# Patient Record
Sex: FEMALE | Race: WHITE | NOT HISPANIC OR LATINO | Employment: FULL TIME | URBAN - METROPOLITAN AREA
[De-identification: names, ages, dates, MRNs, and addresses within clinical notes are randomized per-mention and may not be internally consistent; named-entity substitution may affect disease eponyms.]

---

## 2017-10-10 ENCOUNTER — OFFICE VISIT (OUTPATIENT)
Dept: URGENT CARE | Facility: CLINIC | Age: 57
End: 2017-10-10
Payer: COMMERCIAL

## 2017-10-10 ENCOUNTER — APPOINTMENT (OUTPATIENT)
Dept: RADIOLOGY | Facility: CLINIC | Age: 57
End: 2017-10-10
Payer: COMMERCIAL

## 2017-10-10 DIAGNOSIS — S90.859A: ICD-10-CM

## 2017-10-10 PROCEDURE — 73630 X-RAY EXAM OF FOOT: CPT

## 2017-10-10 PROCEDURE — G0382 LEV 3 HOSP TYPE B ED VISIT: HCPCS

## 2017-10-14 NOTE — PROGRESS NOTES
Assessment  1  Foreign body in foot (917 6) (P79 089Q)   2  Left foot pain (729 5) (B61 693)    Plan  Foreign body in foot    · * XR FOOT 3+ VIEW LEFT; Status:Active - Retrospective Authorization; Requested  for:10Oct2017;     Discussion/Summary  Discussion Summary:   Follow up with podiatry  Understands and agrees with treatment plan: The treatment plan was reviewed with the patient/guardian  The patient/guardian understands and agrees with the treatment plan   Counseling Documentation With Imm: The patient was counseled regarding instructions for management  Chief Complaint  1  Skin Wound  Chief Complaint Free Text Note Form: C/O small tender lump on lateral aspect of her left foot x 3 days  Pt feels that it could be a splinter  History of Present Illness  HPI: Stepped [place] beach [place] of [place]  States her  removed a splinter from her left foot and is having persistent pain at base of left 5th metatarsal  Believes she still has a splinter in her foot and it is painful to walk and place pressure on the area  [facility]:   Pain Assessment   the patient states they have pain  The pain is located in the lateral aspect left foot  The patient describes the pain as aching  (on a scale of 0 to 10, the patient rates the pain at 8 )   Abuse And Domestic Violence Screen   Domestic violence screen not done today  Review of Systems  Focused-Female:   Constitutional: no fever-- and-- no chills  ENT: no hoarseness  Cardiovascular: no chest pain  Respiratory: no cough  Gastrointestinal: no nausea-- and-- no vomiting  Musculoskeletal: no arthralgias,-- no joint swelling-- and-- no myalgias  Integumentary: no rashes  Neurological: no headache  ROS Reviewed:   ROS reviewed  Active Problems  1  Hypothyroidism (244 9) (E03 9)   2  Lyme disease (088 81) (A69 20)   3   Streptococcal sore throat (034 0) (J02 0)    Past Medical History  1  History of pharyngitis (V12 69) (Z87 09)   2  History of vaginal pruritus (V13 29) (Z87 42)   3  History of Vaginal burning (625 8) (N94 9)  Active Problems And Past Medical History Reviewed: The active problems and past medical history were reviewed and updated today  Social History   · Former smoker (Z11 26) (I22 448)   · Never A Smoker  Social History Reviewed: The social history was reviewed and updated today  Surgical History  1  History of Tonsillectomy With Adenoidectomy  Surgical History Reviewed: The surgical history was reviewed and updated today  Current Meds   1  [place] Thyroid 30 MG Oral Tablet Recorded   2  [place] Thyroid TABS; Therapy: (Recorded:[id number]) to Recorded   3  Hormone Cream Base Cream Recorded   4  Temovate E 0 05 % CREA; APPLY AND GENTLY MASSAGE INTO AFFECTED AREA(S)   TWICE DAILY; Therapy: [id number] to (Last FL:[id number])  Requested for: 01IEJ5437 Ordered  Medication List Reviewed: The medication list was reviewed and updated today  Allergies  1  Amoxil TABS    Vitals  Signs   Recorded: [id number] 02:32PM   Temperature: 97 2 F  Heart Rate: 78  Respiration: 18  Systolic: 045  Diastolic: 72  Height: 5 ft 2 in  Weight: 175 lb   BMI Calculated: 32 01  BSA Calculated: 1 81  O2 Saturation: 99  Pain Scale: 8    Physical Exam    Constitutional   General appearance: No acute distress, well appearing and well nourished  Eyes   Conjunctiva and lids: No swelling, erythema or discharge  Ears, Nose, Mouth, and Throat   External inspection of ears and nose: Normal     Pulmonary   Respiratory effort: No increased work of breathing or signs of respiratory distress  Musculoskeletal   Gait and station: Normal  -- tenderness over lateral aspect at base of left 5th metatarsal, no drainage  Results/Data  Diagnostic Studies Reviewed: I personally reviewed the films/images/results in the office today  My interpretation follows  X-ray Review left foot - no radiopaque foreign body, no fracture        Signatures   Electronically signed by : LUIZ Rincon; Oct 10 2017  3:19PM EST                       (Author)    Electronically signed by : ADELINA Jackson ; Oct 13 2017 11:17AM EST                       (Co-author)

## 2018-07-24 ENCOUNTER — APPOINTMENT (OUTPATIENT)
Dept: LAB | Facility: CLINIC | Age: 58
End: 2018-07-24
Payer: COMMERCIAL

## 2018-07-24 ENCOUNTER — TRANSCRIBE ORDERS (OUTPATIENT)
Dept: URGENT CARE | Facility: CLINIC | Age: 58
End: 2018-07-24

## 2018-07-24 DIAGNOSIS — D51.0 PERNICIOUS ANEMIA: ICD-10-CM

## 2018-07-24 DIAGNOSIS — E78.2 MIXED HYPERLIPIDEMIA: ICD-10-CM

## 2018-07-24 DIAGNOSIS — R53.83 OTHER FATIGUE: Primary | ICD-10-CM

## 2018-07-24 DIAGNOSIS — R53.83 OTHER FATIGUE: ICD-10-CM

## 2018-07-24 LAB
25(OH)D3 SERPL-MCNC: 34.4 NG/ML (ref 30–100)
ALBUMIN SERPL BCP-MCNC: 4.1 G/DL (ref 3.5–5)
ALP SERPL-CCNC: 57 U/L (ref 46–116)
ALT SERPL W P-5'-P-CCNC: 34 U/L (ref 12–78)
ANION GAP SERPL CALCULATED.3IONS-SCNC: 3 MMOL/L (ref 4–13)
AST SERPL W P-5'-P-CCNC: 24 U/L (ref 5–45)
BASOPHILS # BLD AUTO: 0.05 THOUSANDS/ΜL (ref 0–0.1)
BASOPHILS NFR BLD AUTO: 1 % (ref 0–1)
BILIRUB DIRECT SERPL-MCNC: 0.1 MG/DL (ref 0–0.2)
BILIRUB SERPL-MCNC: 0.5 MG/DL (ref 0.2–1)
BUN SERPL-MCNC: 14 MG/DL (ref 5–25)
CALCIUM SERPL-MCNC: 9.4 MG/DL (ref 8.3–10.1)
CHLORIDE SERPL-SCNC: 104 MMOL/L (ref 100–108)
CHOLEST SERPL-MCNC: 227 MG/DL (ref 50–200)
CO2 SERPL-SCNC: 30 MMOL/L (ref 21–32)
CREAT SERPL-MCNC: 0.71 MG/DL (ref 0.6–1.3)
CRP SERPL HS-MCNC: <0.9 MG/L
EOSINOPHIL # BLD AUTO: 0.09 THOUSAND/ΜL (ref 0–0.61)
EOSINOPHIL NFR BLD AUTO: 2 % (ref 0–6)
ERYTHROCYTE [DISTWIDTH] IN BLOOD BY AUTOMATED COUNT: 14.3 % (ref 11.6–15.1)
ERYTHROCYTE [SEDIMENTATION RATE] IN BLOOD: 7 MM/HOUR (ref 0–20)
FERRITIN SERPL-MCNC: 94 NG/ML (ref 8–388)
GFR SERPL CREATININE-BSD FRML MDRD: 94 ML/MIN/1.73SQ M
GLUCOSE P FAST SERPL-MCNC: 96 MG/DL (ref 65–99)
HCT VFR BLD AUTO: 43.3 % (ref 34.8–46.1)
HDLC SERPL-MCNC: 95 MG/DL (ref 40–60)
HGB BLD-MCNC: 13.8 G/DL (ref 11.5–15.4)
IMM GRANULOCYTES # BLD AUTO: 0.01 THOUSAND/UL (ref 0–0.2)
IMM GRANULOCYTES NFR BLD AUTO: 0 % (ref 0–2)
IRON SATN MFR SERPL: 20 %
IRON SERPL-MCNC: 71 UG/DL (ref 50–170)
LDLC SERPL CALC-MCNC: 114 MG/DL (ref 0–100)
LYMPHOCYTES # BLD AUTO: 1.67 THOUSANDS/ΜL (ref 0.6–4.47)
LYMPHOCYTES NFR BLD AUTO: 36 % (ref 14–44)
MCH RBC QN AUTO: 28.8 PG (ref 26.8–34.3)
MCHC RBC AUTO-ENTMCNC: 31.9 G/DL (ref 31.4–37.4)
MCV RBC AUTO: 90 FL (ref 82–98)
MONOCYTES # BLD AUTO: 0.42 THOUSAND/ΜL (ref 0.17–1.22)
MONOCYTES NFR BLD AUTO: 9 % (ref 4–12)
NEUTROPHILS # BLD AUTO: 2.35 THOUSANDS/ΜL (ref 1.85–7.62)
NEUTS SEG NFR BLD AUTO: 52 % (ref 43–75)
NONHDLC SERPL-MCNC: 132 MG/DL
NRBC BLD AUTO-RTO: 0 /100 WBCS
PLATELET # BLD AUTO: 271 THOUSANDS/UL (ref 149–390)
PMV BLD AUTO: 11.2 FL (ref 8.9–12.7)
POTASSIUM SERPL-SCNC: 4.6 MMOL/L (ref 3.5–5.3)
PROT SERPL-MCNC: 7.8 G/DL (ref 6.4–8.2)
RBC # BLD AUTO: 4.8 MILLION/UL (ref 3.81–5.12)
SODIUM SERPL-SCNC: 137 MMOL/L (ref 136–145)
T3FREE SERPL-MCNC: 2.49 PG/ML (ref 2.3–4.2)
TIBC SERPL-MCNC: 364 UG/DL (ref 250–450)
TRIGL SERPL-MCNC: 91 MG/DL
TSH SERPL DL<=0.05 MIU/L-ACNC: 1.73 UIU/ML (ref 0.36–3.74)
VIT B12 SERPL-MCNC: 455 PG/ML (ref 100–900)
WBC # BLD AUTO: 4.59 THOUSAND/UL (ref 4.31–10.16)

## 2018-07-24 PROCEDURE — 86141 C-REACTIVE PROTEIN HS: CPT

## 2018-07-24 PROCEDURE — 80076 HEPATIC FUNCTION PANEL: CPT

## 2018-07-24 PROCEDURE — 36415 COLL VENOUS BLD VENIPUNCTURE: CPT

## 2018-07-24 PROCEDURE — 80048 BASIC METABOLIC PNL TOTAL CA: CPT

## 2018-07-24 PROCEDURE — 84443 ASSAY THYROID STIM HORMONE: CPT

## 2018-07-24 PROCEDURE — 82728 ASSAY OF FERRITIN: CPT

## 2018-07-24 PROCEDURE — 80061 LIPID PANEL: CPT

## 2018-07-24 PROCEDURE — 83540 ASSAY OF IRON: CPT

## 2018-07-24 PROCEDURE — 82306 VITAMIN D 25 HYDROXY: CPT

## 2018-07-24 PROCEDURE — 85025 COMPLETE CBC W/AUTO DIFF WBC: CPT

## 2018-07-24 PROCEDURE — 85652 RBC SED RATE AUTOMATED: CPT

## 2018-07-24 PROCEDURE — 86617 LYME DISEASE ANTIBODY: CPT

## 2018-07-24 PROCEDURE — 86618 LYME DISEASE ANTIBODY: CPT

## 2018-07-24 PROCEDURE — 82607 VITAMIN B-12: CPT

## 2018-07-24 PROCEDURE — 83550 IRON BINDING TEST: CPT

## 2018-07-24 PROCEDURE — 84481 FREE ASSAY (FT-3): CPT

## 2018-07-26 LAB
B BURGDOR IGG SER IA-ACNC: 0.54
B BURGDOR IGM SER IA-ACNC: 1.94

## 2018-07-27 ENCOUNTER — HOSPITAL ENCOUNTER (OUTPATIENT)
Facility: HOSPITAL | Age: 58
Setting detail: OBSERVATION
Discharge: HOME | End: 2018-07-29
Attending: HOSPITALIST | Admitting: HOSPITALIST
Payer: COMMERCIAL

## 2018-07-27 DIAGNOSIS — K20.90 ACUTE ESOPHAGITIS: Primary | ICD-10-CM

## 2018-07-27 DIAGNOSIS — R51.9 HEADACHE: ICD-10-CM

## 2018-07-27 DIAGNOSIS — M25.562 ARTHRALGIA OF BOTH KNEES: ICD-10-CM

## 2018-07-27 DIAGNOSIS — M25.561 ARTHRALGIA OF BOTH KNEES: ICD-10-CM

## 2018-07-27 DIAGNOSIS — A69.20 LYME DISEASE: ICD-10-CM

## 2018-07-27 DIAGNOSIS — A69.20 ACUTE LYME DISEASE: ICD-10-CM

## 2018-07-27 PROBLEM — G44.89 OTHER HEADACHE SYNDROME: Status: ACTIVE | Noted: 2018-07-27

## 2018-07-27 PROBLEM — M79.10 MYALGIA: Status: ACTIVE | Noted: 2018-07-27

## 2018-07-27 LAB
APTT PPP: 31 SECONDS (ref 24–36)
B BURGDOR IGG PATRN SER IB-IMP: NEGATIVE
B BURGDOR IGM PATRN SER IB-IMP: NEGATIVE
B BURGDOR18KD IGG SER QL IB: NORMAL
B BURGDOR23KD IGG SER QL IB: NORMAL
B BURGDOR23KD IGM SER QL IB: NORMAL
B BURGDOR28KD IGG SER QL IB: NORMAL
B BURGDOR30KD IGG SER QL IB: NORMAL
B BURGDOR39KD IGG SER QL IB: NORMAL
B BURGDOR39KD IGM SER QL IB: NORMAL
B BURGDOR41KD IGG SER QL IB: NORMAL
B BURGDOR41KD IGM SER QL IB: NORMAL
B BURGDOR45KD IGG SER QL IB: NORMAL
B BURGDOR58KD IGG SER QL IB: NORMAL
B BURGDOR66KD IGG SER QL IB: NORMAL
B BURGDOR93KD IGG SER QL IB: NORMAL
BACTERIA UR QL AUTO: ABNORMAL /HPF
BASOPHILS # BLD AUTO: 0.1 THOUSANDS/ΜL (ref 0–0.1)
BASOPHILS NFR BLD AUTO: 1 % (ref 0–2)
BILIRUB UR QL STRIP: NEGATIVE
CLARITY UR: CLEAR
COLOR UR: YELLOW
EOSINOPHIL # BLD AUTO: 0.1 THOUSAND/ΜL (ref 0–0.61)
EOSINOPHIL NFR BLD AUTO: 2 % (ref 0–5)
ERYTHROCYTE [DISTWIDTH] IN BLOOD BY AUTOMATED COUNT: 14.2 % (ref 11.5–14.5)
ERYTHROCYTE [SEDIMENTATION RATE] IN BLOOD: 8 MM/HOUR (ref 0–30)
GLUCOSE UR STRIP-MCNC: NEGATIVE MG/DL
HCT VFR BLD AUTO: 41 % (ref 34.8–46.1)
HGB BLD-MCNC: 13.9 G/DL (ref 12–16)
HGB UR QL STRIP.AUTO: NEGATIVE
INR PPP: 1.04 (ref 0.9–1.5)
KETONES UR STRIP-MCNC: NEGATIVE MG/DL
LEUKOCYTE ESTERASE UR QL STRIP: ABNORMAL
LYMPHOCYTES # BLD AUTO: 2.2 THOUSANDS/ΜL (ref 0.6–4.47)
LYMPHOCYTES NFR BLD AUTO: 30 % (ref 21–51)
MCH RBC QN AUTO: 29.5 PG (ref 26–34)
MCHC RBC AUTO-ENTMCNC: 34 G/DL (ref 31–37)
MCV RBC AUTO: 87 FL (ref 81–99)
MONOCYTES # BLD AUTO: 0.7 THOUSAND/ΜL (ref 0.17–1.22)
MONOCYTES NFR BLD AUTO: 10 % (ref 2–12)
NEUTROPHILS # BLD AUTO: 4.2 THOUSANDS/ΜL (ref 1.4–6.5)
NEUTS SEG NFR BLD AUTO: 58 % (ref 42–75)
NITRITE UR QL STRIP: NEGATIVE
NON-SQ EPI CELLS URNS QL MICRO: ABNORMAL /HPF
NRBC BLD AUTO-RTO: 0 /100 WBCS
PH UR STRIP.AUTO: 7 [PH] (ref 5–8)
PLATELET # BLD AUTO: 234 THOUSANDS/UL (ref 149–390)
PMV BLD AUTO: 8.7 FL (ref 8.6–11.7)
PROT UR STRIP-MCNC: NEGATIVE MG/DL
PROTHROMBIN TIME: 12.1 SECONDS (ref 10.1–12.9)
RBC # BLD AUTO: 4.72 MILLION/UL (ref 3.9–5.2)
RBC #/AREA URNS AUTO: ABNORMAL /HPF
S PYO AG THROAT QL: NEGATIVE
SP GR UR STRIP.AUTO: <=1.005 (ref 1–1.03)
UROBILINOGEN UR QL STRIP.AUTO: 0.2 E.U./DL
WBC # BLD AUTO: 7.2 THOUSAND/UL (ref 4.8–10.8)
WBC #/AREA URNS AUTO: ABNORMAL /HPF

## 2018-07-27 PROCEDURE — 81003 URINALYSIS AUTO W/O SCOPE: CPT | Performed by: PHYSICIAN ASSISTANT

## 2018-07-27 PROCEDURE — 85025 COMPLETE CBC W/AUTO DIFF WBC: CPT | Performed by: PHYSICIAN ASSISTANT

## 2018-07-27 PROCEDURE — 87070 CULTURE OTHR SPECIMN AEROBIC: CPT | Performed by: NURSE PRACTITIONER

## 2018-07-27 PROCEDURE — 81001 URINALYSIS AUTO W/SCOPE: CPT | Performed by: PHYSICIAN ASSISTANT

## 2018-07-27 PROCEDURE — 99220 PR INITIAL OBSERVATION CARE/DAY 70 MINUTES: CPT | Performed by: PHYSICIAN ASSISTANT

## 2018-07-27 PROCEDURE — 96361 HYDRATE IV INFUSION ADD-ON: CPT

## 2018-07-27 PROCEDURE — 85610 PROTHROMBIN TIME: CPT | Performed by: PHYSICIAN ASSISTANT

## 2018-07-27 PROCEDURE — 86663 EPSTEIN-BARR ANTIBODY: CPT | Performed by: PHYSICIAN ASSISTANT

## 2018-07-27 PROCEDURE — 86664 EPSTEIN-BARR NUCLEAR ANTIGEN: CPT | Performed by: PHYSICIAN ASSISTANT

## 2018-07-27 PROCEDURE — 99284 EMERGENCY DEPT VISIT MOD MDM: CPT

## 2018-07-27 PROCEDURE — 96375 TX/PRO/DX INJ NEW DRUG ADDON: CPT

## 2018-07-27 PROCEDURE — 87430 STREP A AG IA: CPT | Performed by: NURSE PRACTITIONER

## 2018-07-27 PROCEDURE — 87040 BLOOD CULTURE FOR BACTERIA: CPT | Performed by: PHYSICIAN ASSISTANT

## 2018-07-27 PROCEDURE — 86644 CMV ANTIBODY: CPT | Performed by: PHYSICIAN ASSISTANT

## 2018-07-27 PROCEDURE — 86645 CMV ANTIBODY IGM: CPT | Performed by: PHYSICIAN ASSISTANT

## 2018-07-27 PROCEDURE — 85730 THROMBOPLASTIN TIME PARTIAL: CPT | Performed by: PHYSICIAN ASSISTANT

## 2018-07-27 PROCEDURE — 36415 COLL VENOUS BLD VENIPUNCTURE: CPT | Performed by: PHYSICIAN ASSISTANT

## 2018-07-27 PROCEDURE — 96374 THER/PROPH/DIAG INJ IV PUSH: CPT

## 2018-07-27 PROCEDURE — 86665 EPSTEIN-BARR CAPSID VCA: CPT | Performed by: PHYSICIAN ASSISTANT

## 2018-07-27 PROCEDURE — C9113 INJ PANTOPRAZOLE SODIUM, VIA: HCPCS | Performed by: PHYSICIAN ASSISTANT

## 2018-07-27 PROCEDURE — 85652 RBC SED RATE AUTOMATED: CPT | Performed by: PHYSICIAN ASSISTANT

## 2018-07-27 RX ORDER — ONDANSETRON 2 MG/ML
4 INJECTION INTRAMUSCULAR; INTRAVENOUS EVERY 6 HOURS PRN
Status: DISCONTINUED | OUTPATIENT
Start: 2018-07-27 | End: 2018-07-29 | Stop reason: HOSPADM

## 2018-07-27 RX ORDER — ZOLPIDEM TARTRATE 5 MG/1
5 TABLET ORAL
Status: DISCONTINUED | OUTPATIENT
Start: 2018-07-27 | End: 2018-07-29 | Stop reason: HOSPADM

## 2018-07-27 RX ORDER — SODIUM CHLORIDE 9 MG/ML
75 INJECTION, SOLUTION INTRAVENOUS CONTINUOUS
Status: DISCONTINUED | OUTPATIENT
Start: 2018-07-27 | End: 2018-07-28 | Stop reason: SDUPTHER

## 2018-07-27 RX ORDER — ACETAMINOPHEN 325 MG/1
650 TABLET ORAL EVERY 6 HOURS PRN
Status: DISCONTINUED | OUTPATIENT
Start: 2018-07-27 | End: 2018-07-29 | Stop reason: HOSPADM

## 2018-07-27 RX ORDER — THYROID,PORK 90 MG
TABLET ORAL
COMMUNITY

## 2018-07-27 RX ORDER — SUCRALFATE ORAL 1 G/10ML
1000 SUSPENSION ORAL EVERY 6 HOURS SCHEDULED
Status: DISCONTINUED | OUTPATIENT
Start: 2018-07-27 | End: 2018-07-29

## 2018-07-27 RX ORDER — PANTOPRAZOLE SODIUM 40 MG/1
40 INJECTION, POWDER, FOR SOLUTION INTRAVENOUS EVERY 12 HOURS SCHEDULED
Status: DISCONTINUED | OUTPATIENT
Start: 2018-07-27 | End: 2018-07-29

## 2018-07-27 RX ORDER — KETOROLAC TROMETHAMINE 30 MG/ML
30 INJECTION, SOLUTION INTRAMUSCULAR; INTRAVENOUS EVERY 6 HOURS SCHEDULED
Status: DISCONTINUED | OUTPATIENT
Start: 2018-07-28 | End: 2018-07-29 | Stop reason: HOSPADM

## 2018-07-27 RX ORDER — LEVOTHYROXINE SODIUM 0.07 MG/1
150 TABLET ORAL
Status: DISCONTINUED | OUTPATIENT
Start: 2018-07-28 | End: 2018-07-27

## 2018-07-27 RX ORDER — KETOROLAC TROMETHAMINE 30 MG/ML
30 INJECTION, SOLUTION INTRAMUSCULAR; INTRAVENOUS ONCE
Status: COMPLETED | OUTPATIENT
Start: 2018-07-27 | End: 2018-07-27

## 2018-07-27 RX ORDER — HEPARIN SODIUM 5000 [USP'U]/ML
5000 INJECTION, SOLUTION INTRAVENOUS; SUBCUTANEOUS EVERY 8 HOURS SCHEDULED
Status: DISCONTINUED | OUTPATIENT
Start: 2018-07-27 | End: 2018-07-28 | Stop reason: SDUPTHER

## 2018-07-27 RX ADMIN — DOXYCYCLINE 100 MG: 100 INJECTION, POWDER, LYOPHILIZED, FOR SOLUTION INTRAVENOUS at 20:26

## 2018-07-27 RX ADMIN — KETOROLAC TROMETHAMINE 30 MG: 30 INJECTION, SOLUTION INTRAMUSCULAR at 23:25

## 2018-07-27 RX ADMIN — KETOROLAC TROMETHAMINE 30 MG: 30 INJECTION, SOLUTION INTRAMUSCULAR; INTRAVENOUS at 19:23

## 2018-07-27 RX ADMIN — SODIUM CHLORIDE 1000 ML: 0.9 INJECTION, SOLUTION INTRAVENOUS at 19:24

## 2018-07-27 RX ADMIN — ZOLPIDEM TARTRATE 5 MG: 5 TABLET, FILM COATED ORAL at 23:25

## 2018-07-27 RX ADMIN — SODIUM CHLORIDE 75 ML/HR: 9 INJECTION, SOLUTION INTRAVENOUS at 22:19

## 2018-07-27 RX ADMIN — PANTOPRAZOLE SODIUM 40 MG: 40 INJECTION, POWDER, FOR SOLUTION INTRAVENOUS at 22:28

## 2018-07-28 LAB — PLATELET # BLD AUTO: 198 THOUSANDS/UL (ref 149–390)

## 2018-07-28 PROCEDURE — 99225 PR SBSQ OBSERVATION CARE/DAY 25 MINUTES: CPT | Performed by: HOSPITALIST

## 2018-07-28 PROCEDURE — C9113 INJ PANTOPRAZOLE SODIUM, VIA: HCPCS | Performed by: PHYSICIAN ASSISTANT

## 2018-07-28 PROCEDURE — 85049 AUTOMATED PLATELET COUNT: CPT | Performed by: PHYSICIAN ASSISTANT

## 2018-07-28 RX ORDER — SODIUM CHLORIDE 9 MG/ML
125 INJECTION, SOLUTION INTRAVENOUS CONTINUOUS
Status: DISCONTINUED | OUTPATIENT
Start: 2018-07-28 | End: 2018-07-29 | Stop reason: HOSPADM

## 2018-07-28 RX ORDER — HEPARIN SODIUM 5000 [USP'U]/ML
5000 INJECTION, SOLUTION INTRAVENOUS; SUBCUTANEOUS EVERY 8 HOURS SCHEDULED
Status: DISCONTINUED | OUTPATIENT
Start: 2018-07-28 | End: 2018-07-29 | Stop reason: HOSPADM

## 2018-07-28 RX ADMIN — SODIUM CHLORIDE 125 ML/HR: 9 INJECTION, SOLUTION INTRAVENOUS at 12:30

## 2018-07-28 RX ADMIN — KETOROLAC TROMETHAMINE 30 MG: 30 INJECTION, SOLUTION INTRAMUSCULAR at 13:53

## 2018-07-28 RX ADMIN — SUCRALFATE 1000 MG: 1 SUSPENSION ORAL at 22:40

## 2018-07-28 RX ADMIN — KETOROLAC TROMETHAMINE 30 MG: 30 INJECTION, SOLUTION INTRAMUSCULAR at 06:33

## 2018-07-28 RX ADMIN — KETOROLAC TROMETHAMINE 30 MG: 30 INJECTION, SOLUTION INTRAMUSCULAR at 22:39

## 2018-07-28 RX ADMIN — SUCRALFATE 1000 MG: 1 SUSPENSION ORAL at 06:33

## 2018-07-28 RX ADMIN — PANTOPRAZOLE SODIUM 40 MG: 40 INJECTION, POWDER, FOR SOLUTION INTRAVENOUS at 20:18

## 2018-07-28 RX ADMIN — Medication 1 SPRAY: at 08:49

## 2018-07-28 RX ADMIN — SODIUM CHLORIDE 125 ML/HR: 9 INJECTION, SOLUTION INTRAVENOUS at 22:39

## 2018-07-28 RX ADMIN — PANTOPRAZOLE SODIUM 40 MG: 40 INJECTION, POWDER, FOR SOLUTION INTRAVENOUS at 08:51

## 2018-07-28 RX ADMIN — DOXYCYCLINE 100 MG: 100 INJECTION, POWDER, LYOPHILIZED, FOR SOLUTION INTRAVENOUS at 08:36

## 2018-07-28 RX ADMIN — SUCRALFATE 1000 MG: 1 SUSPENSION ORAL at 18:25

## 2018-07-28 RX ADMIN — DOXYCYCLINE 100 MG: 100 INJECTION, POWDER, LYOPHILIZED, FOR SOLUTION INTRAVENOUS at 20:18

## 2018-07-28 RX ADMIN — SUCRALFATE 1000 MG: 1 SUSPENSION ORAL at 11:08

## 2018-07-28 NOTE — ASSESSMENT & PLAN NOTE
· Unclear exact etiology  · Patient has had difficulty with both solids and liquids now for almost 5 days  · She was prescribed doxycycline by her outpatient primary care physician once her Lyme titers were positive -however she never even had the prescription filled  · She is status post 2 doses of IV doxycycline thus far while in-house  · She reports improvement in swallowing  · She has been on clear liquids, at this time I will advance her to a dental mechanical soft diet  · Will consult GI -patient will benefit from an endoscopy even if this is done as an outpatient  · Continue PPI IV b i d

## 2018-07-28 NOTE — ED PROVIDER NOTES
History  Chief Complaint   Patient presents with    Sore Throat     started three weeks ago, with cough and feeling as if there is something stuck in her throat     Patient presents emergency room with has been with complaints of sore throat and inability to swallow for the past 2 days  Patient states initial symptoms began with a headache intermittently 3 weeks ago  She states she then developed increased fatigue ear pain facial tingling  She also admits to then developing joint aches bilateral knees  Patient denies shortness of breath or chest pain  She went to PCP had blood work obtained which revealed a positive Lyme titer  She found result fell today and was given a script for doxycycline  Patient states however due to the severe esophagitis she has been unable to keep anything down had 1 episode of vomiting today when trying to take a pill  She presents to emergency room with increased throat soreness which she now feels is going down further into her chest   Denies palpitation lightheaded dizziness  Denies loss of hearing or vision denies facial droop confusion or slurred speech  History provided by:  Patient and spouse  Sore Throat   Associated symptoms: chills, ear pain, headaches and trouble swallowing    Associated symptoms: no abdominal pain, no chest pain, no cough, no epistaxis, no fever, no rash, no rhinorrhea, no shortness of breath, no stridor and no voice change      Allergies   Allergen Reactions    Prednisone Swelling    Doxycycline GI Intolerance    Tetanus Immune Globulin          Prior to Admission Medications   Prescriptions Last Dose Informant Patient Reported? Taking?    Hormone Cream Base CREA 7/27/2018 at Unknown time  Yes Yes   Sig: by Does not apply route   thyroid (ARMOUR THYROID) 90 MG tablet 7/27/2018 at Unknown time  Yes Yes   Sig: Take by mouth      Facility-Administered Medications: None       Past Medical History:   Diagnosis Date    Disease of thyroid gland Past Surgical History:   Procedure Laterality Date    TONSILLECTOMY         History reviewed  No pertinent family history  I have reviewed and agree with the history as documented  Social History   Substance Use Topics    Smoking status: Never Smoker    Smokeless tobacco: Never Used    Alcohol use 3 6 oz/week     6 Glasses of wine per week        Review of Systems   Constitutional: Positive for chills and fatigue  Negative for diaphoresis and fever  HENT: Positive for ear pain, sore throat and trouble swallowing  Negative for congestion, facial swelling, hearing loss, nosebleeds, rhinorrhea, sneezing and voice change  Eyes: Positive for visual disturbance  Negative for pain and redness  Respiratory: Negative for cough, shortness of breath, wheezing and stridor  Cardiovascular: Negative for chest pain, palpitations and leg swelling  Gastrointestinal: Positive for nausea and vomiting  Negative for abdominal distention, abdominal pain, blood in stool, constipation and diarrhea  Genitourinary: Negative for difficulty urinating, dysuria, frequency, hematuria and urgency  Musculoskeletal: Negative for gait problem, myalgias and neck pain  Skin: Negative for rash  Neurological: Positive for dizziness and headaches  Negative for seizures, syncope, facial asymmetry, speech difficulty, weakness and light-headedness  All other systems reviewed and are negative  Physical Exam  Physical Exam   Constitutional: She is oriented to person, place, and time  She appears well-developed and well-nourished  HENT:   Head: Normocephalic and atraumatic  Right Ear: Hearing, tympanic membrane, external ear and ear canal normal    Left Ear: Hearing, tympanic membrane, external ear and ear canal normal    Nose: Nose normal  No rhinorrhea or sinus tenderness  No epistaxis  Mouth/Throat: Mucous membranes are dry  Posterior oropharyngeal erythema present   No oropharyngeal exudate, posterior oropharyngeal edema or tonsillar abscesses  No facial asymmetry noted  Tenderness to bilateral cervical lymphadenopathy  Eyes: EOM are normal  Pupils are equal, round, and reactive to light  Right eye exhibits no discharge  Left eye exhibits no discharge  Neck: Normal range of motion  Neck supple  No JVD present  No tracheal deviation present  Cardiovascular: Normal rate, regular rhythm, normal heart sounds and intact distal pulses  Exam reveals no gallop and no friction rub  No murmur heard  Pulmonary/Chest: Effort normal and breath sounds normal  No stridor  No respiratory distress  She has no wheezes  She has no rales  Abdominal: Soft  Bowel sounds are normal  She exhibits no distension and no mass  There is no tenderness  Musculoskeletal: Normal range of motion  She exhibits tenderness (Bilateral knees on palpation)  She exhibits no edema  Lymphadenopathy:     She has cervical adenopathy  Neurological: She is alert and oriented to person, place, and time  No cranial nerve deficit or sensory deficit  She exhibits normal muscle tone  Skin: Skin is warm and dry  No rash noted  No erythema  Nursing note and vitals reviewed        Vital Signs  ED Triage Vitals [07/27/18 1814]   Temperature Pulse Respirations Blood Pressure SpO2   97 9 °F (36 6 °C) 70 18 141/68 100 %      Temp Source Heart Rate Source Patient Position - Orthostatic VS BP Location FiO2 (%)   Temporal Monitor Sitting Left arm --      Pain Score       7           Vitals:    07/27/18 1814   BP: 141/68   Pulse: 70   Patient Position - Orthostatic VS: Sitting       Visual Acuity      ED Medications  Medications   doxycycline (VIBRAMYCIN) 100 mg in sodium chloride 0 9 % 100 mL IVPB (100 mg Intravenous New Bag 7/27/18 2026)   sodium chloride 0 9 % bolus 1,000 mL (1,000 mL Intravenous New Bag 7/27/18 1924)   ketorolac (TORADOL) injection 30 mg (30 mg Intravenous Given 7/27/18 1923)       Diagnostic Studies  Results Reviewed Procedure Component Value Units Date/Time    Sedimentation rate, automated [[id number]]  (Normal) Collected:  07/27/18 1918    Lab Status:  Final result Specimen:  Blood from Arm, Left Updated:  07/27/18 2004     Sed Rate 8 mm/hour     Urine Microscopic [[id number]]  (Abnormal) Collected:  07/27/18 1946    Lab Status:  Final result Specimen:  Urine from Urine, Clean Catch Updated:  07/27/18 2002     RBC, UA None Seen /hpf      WBC, UA 4-10 (A) /hpf      Epithelial Cells None Seen /hpf      Bacteria, UA Occasional /hpf     UA w Reflex to Microscopic w Reflex to Culture [[id number]]  (Abnormal) Collected:  07/27/18 1946    Lab Status:  Final result Specimen:  Urine from Urine, Clean Catch Updated:  07/27/18 1959     Color, UA Yellow     Clarity, UA Clear     Specific Gravity, UA <=1 005 (L)     pH, UA 7 0     Leukocytes, UA 1+ (A)     Nitrite, UA Negative     Protein, UA Negative mg/dl      Glucose, UA Negative mg/dl      Ketones, UA Negative mg/dl      Urobilinogen, UA 0 2 E U /dl      Bilirubin, UA Negative     Blood, UA Negative    Protime-INR [[id number]]  (Normal) Collected:  07/27/18 1918    Lab Status:  Final result Specimen:  Blood from Arm, Left Updated:  07/27/18 1942     Protime 12 1 seconds      INR 1 04    APTT [[id number]]  (Normal) Collected:  07/27/18 1918    Lab Status:  Final result Specimen:  Blood from Arm, Left Updated:  07/27/18 1942     PTT 31 seconds     CBC and differential [[id number]] Collected:  07/27/18 1918    Lab Status:  Final result Specimen:  Blood from Arm, Left Updated:  07/27/18 1927     WBC 7 20 Thousand/uL      RBC 4 72 Million/uL      Hemoglobin 13 9 g/dL      Hematocrit 41 0 %      MCV 87 fL      MCH 29 5 pg      MCHC 34 0 g/dL      RDW 14 2 %      MPV 8 7 fL      Platelets 997 Thousands/uL      nRBC 0 /100 WBCs      Neutrophils Relative 58 %      Lymphocytes Relative 30 %      Monocytes Relative 10 %      Eosinophils Relative 2 %      Basophils Relative 1 %      Neutrophils Absolute 4 20 Thousands/µL      Lymphocytes Absolute 2 20 Thousands/µL      Monocytes Absolute 0 70 Thousand/µL      Eosinophils Absolute 0 10 Thousand/µL      Basophils Absolute 0 10 Thousands/µL     Blood culture #1 [[id number]] Collected:  07/27/18 1918    Lab Status: In process Specimen:  Blood from Hand, Right Updated:  07/27/18 1924    Blood culture #2 [[id number]] Collected:  07/27/18 1918    Lab Status: In process Specimen:  Blood from Arm, Left Updated:  07/27/18 1924    CMV IgG/IgM Antibodies [[id number]] Collected:  07/27/18 1918    Lab Status: In process Specimen:  Blood from Arm, Left Updated:  07/27/18 1924    EBV acute panel [[id number]] Collected:  07/27/18 1918    Lab Status: In process Specimen:  Blood from Arm, Left Updated:  07/27/18 1924                 No orders to display              Procedures  Procedures       Phone Contacts  ED Phone Contact    ED Course  ED Course as of Jul 27 2049 Fri Jul 27, 2018 2030 Call placed to hospitalist for admission  Call returned by [facility] who agrees to accept inpatient and follow work  2032   EKG was unobtainable due to poor leads  Rhythm strip reviewed no heart block noted normal sinus  rhythm heart rate 68  MDM  Number of Diagnoses or Management Options  Acute esophagitis:   Acute Lyme disease suspected:    Arthralgia of both knees:   Headache:      Amount and/or Complexity of Data Reviewed  Clinical lab tests: ordered and reviewed  Review and summarize past medical records: yes  Independent visualization of images, tracings, or specimens: yes    Risk of Complications, Morbidity, and/or Mortality  Presenting problems: moderate  Diagnostic procedures: moderate  Management options: moderate    Patient Progress  Patient progress: stable    CritCare Time    Disposition  Final diagnoses:   Acute esophagitis   Headache   Arthralgia of both knees   Acute Lyme disease suspected     Time reflects when diagnosis was documented in both MDM as applicable and the Disposition within this note     Time User Action Codes Description Comment    7/27/2018  8:33 PM Natalya Salvador Add [A69 20] Lyme disease, acute     7/27/2018  8:34 PM Kathy Keens M Add [K20 9] Esophagitis, unspecified     7/27/2018  8:34 PM Kathy Keens M Modify [K20 9] Esophagitis, unspecified     7/27/2018  8:34 PM Kathy Keens M Remove [A69 20] Lyme disease, acute     7/27/2018  8:34 PM Kathy Keens M Add [K20 9] Acute esophagitis     7/27/2018  8:34 PM Kathy Keens M Add [R51] Headache     7/27/2018  8:35 PM Kathy Keens M Add [O60 931,  M25 562] Arthralgia of both knees     7/27/2018  8:35 PM Natalya Salvador Modify [K20 9] Acute esophagitis     7/27/2018  8:35 PM Kathy Keens M Remove [K20 9] Esophagitis, unspecified     7/27/2018  8:35 PM Kathy Keens M Add [A69 20] Acute Lyme disease     7/27/2018  8:35 PM Kathy Keens M Modify [A69 20] Acute Lyme disease suspected       ED Disposition     ED Disposition Condition Comment    Admit  Case was discussed with hospitalist, [facility] and the patient's admission status was agreed to be Admission Status: inpatient status to the service of Dr Gamaliel Morris   Follow-up Information    None         Patient's Medications   Discharge Prescriptions    No medications on file     No discharge procedures on file      ED Provider  Electronically Signed by           Joy Sanchez PA-C  07/27/18 2043       Joy Sanchez PA-C  07/27/18 2049

## 2018-07-28 NOTE — PROGRESS NOTES
Progress Note - Aylin Jensen 1960, 62 y o  female MRN: [id number]    Unit/Bed#: -01 Encounter: [id number]    Primary Care Provider: LORAINE Littlejohn   Date and time admitted to hospital: 7/27/2018  6:22 PM        * Acute esophagitis   Assessment & Plan    · Unclear exact etiology  · Patient has had difficulty with both solids and liquids now for almost 5 days  · She was prescribed doxycycline by her outpatient primary care physician once her Lyme titers were positive -however she never even had the prescription filled  · She is status post 2 doses of IV doxycycline thus far while in-house  · She reports improvement in swallowing  · She has been on clear liquids, at this time I will advance her to a dental mechanical soft diet  · Will consult GI -patient will benefit from an endoscopy even if this is done as an outpatient  · Continue PPI IV b i d  Lyme disease   Assessment & Plan    · Positive Lyme AB IgM 1 94,negative Western Blot  · Will continue IV doxycycline while in-house  · Patient reports that over the course of her life she has had 2 or 3 prescriptions for doxycycline but has never been able to finish the prescribed course because of severe abdominal pain and discomfort by the 4th or 5th dose  · At the time of discharge will transition the patient to oral amoxicillin for cefuroxime for 21 days         Other headache syndrome   Assessment & Plan    · Likely secondary to acute illness  · Supportive care        Myalgia   Assessment & Plan    · Has currently resolved   · Likely secondary to acute illness  · Supportive care            VTE Pharmacologic Prophylaxis: Pharmacologic: Heparin    Patient Centered Rounds: I have performed bedside rounds with nursing staff today      Discussions with Specialists or Other Care Team Provider:  None  Education and Discussions with Family / Patient: Patient's  was bedside at the time of my evaluation -all questions were answered to the patient's satisfaction    Time Spent for Care: 20 minutes  More than 50% of total time spent on counseling and coordination of care as described above  Current Length of Stay: 1 day(s)    Current Patient Status: Observation   Certification Statement: The patient will continue to require additional inpatient hospital stay due to Continued IV antibiotics, GI evaluation and symptom resolution    Discharge Plan:  Hopeful discharge planning times 24 hr    Code Status: Level 1 - Full Code    Subjective:   Patient seen and examined  Reports a little bit of improvement in her throat pain and swallowing  Reports that her headaches are better    Objective:     Vitals:   Temp (24hrs), Av 4 °F (36 3 °C), Min:97 1 °F (36 2 °C), Max:97 9 °F (36 6 °C)    HR:  [62-70] 62  Resp:  [16-18] 18  BP: (138-143)/(68-78) 138/78  SpO2:  [96 %-100 %] 96 %  Body mass index is 28 76 kg/m²  Input and Output Summary (last 24 hours): Intake/Output Summary (Last 24 hours) at 18 1148  Last data filed at 18 2761   Gross per 24 hour   Intake             2340 ml   Output                0 ml   Net             2340 ml       Physical Exam:     Physical Exam   Constitutional: She is oriented to person, place, and time  She appears well-developed and well-nourished  HENT:   Head: Normocephalic and atraumatic  Nose: Nose normal    On detailed oral examination, patient has minimal posterior pharyngeal erythema   Eyes: Conjunctivae and EOM are normal  Pupils are equal, round, and reactive to light  Neck: Normal range of motion  Neck supple  No JVD present  No thyromegaly present  Cardiovascular: Normal rate, regular rhythm and intact distal pulses  Exam reveals no gallop and no friction rub  No murmur heard  Pulmonary/Chest: Effort normal and breath sounds normal  No respiratory distress  Abdominal: Soft  Bowel sounds are normal  She exhibits no distension and no mass  There is no tenderness  There is no guarding  Musculoskeletal: Normal range of motion  She exhibits no edema  Lymphadenopathy:     She has no cervical adenopathy  Neurological: She is alert and oriented to person, place, and time  No cranial nerve deficit  Skin: Skin is warm  No rash noted  No erythema  Psychiatric: She has a normal mood and affect  Her behavior is normal    Nursing note and vitals reviewed  Additional Data:     Labs:      Results from last 7 days  Lab Units 07/28/18  0519 07/27/18  1918   WBC Thousand/uL  --  7 20   HEMOGLOBIN g/dL  --  13 9   HEMATOCRIT %  --  41 0   PLATELETS Thousands/uL 198 234   NEUTROS PCT %  --  58   LYMPHS PCT %  --  30   MONOS PCT %  --  10   EOS PCT %  --  2       Results from last 7 days  Lab Units 07/24/18  1148   SODIUM mmol/L 137   POTASSIUM mmol/L 4 6   CHLORIDE mmol/L 104   CO2 mmol/L 30   BUN mg/dL 14   CREATININE mg/dL 0 71   CALCIUM mg/dL 9 4   TOTAL PROTEIN g/dL 7 8   BILIRUBIN TOTAL mg/dL 0 50   ALK PHOS U/L 57   ALT U/L 34   AST U/L 24       Results from last 7 days  Lab Units 07/27/18  1918   INR  1 04               * I Have Reviewed All Lab Data Listed Above  * Additional Pertinent Lab Tests Reviewed:  Araceli 66 Admission  Reviewed    Imaging:  Imaging Reports Reviewed Today Include:  None    Recent Cultures (last 7 days):           Last 24 Hours Medication List:     Current Facility-Administered Medications:  acetaminophen 650 mg Oral Q6H PRN Ijeoma Scrape, PA-SARA    doxycycline 100 mg Intravenous Q12H Ijeoma Scrape, PA-C Last Rate: 100 mg (07/28/18 0836)   heparin (porcine) 5,000 Units Subcutaneous Q8H [facility] LORAINE Syed    ketorolac 30 [facility]    ondansetron 4 mg Intravenous Q6H PRN Ijeoma Scrape, PA-SARA    pantoprazole 40 mg [facility]venous [facility]12H [facility]    phenol 1 spray Mouth/Throat Q2H PRN Ijeoma Scrape, PA-C    sodium chloride 125 mL/hr Intravenous Continuous LORAINE Syed Last Rate: Stopped (07/28/18 0848)   sucralfate 1,000 mg Oral Q6H [facility] Rosio Mccain PA-C    zolpidem 5 mg Oral HS PRN Magdaleno Combs PA-C         Today, Patient Was Seen By: Angie Hanson MD    ** Please Note: Dictation voice to text software may have been used in the creation of this document   **

## 2018-07-28 NOTE — CONSULTS
Consultation - Dharmesh Giles 62 y o  female MRN: [id number]  Unit/Bed#: -01 Encounter: [id number]      Assessment/Plan     Assessment:  Acute esophagitis  Dysphagia    Plan:  Patient states that her symptoms have improved after starting IV doxycycline  Tolerated po diet  She would like to defer EGD at this time  F/up in GI clinic as OP  History of Present Illness   Physician Requesting Consult: Natalya Abdul MD  Reason for Consult / Principal Problem: acute esophagitis  Hx and PE limited by:   HPI: May Rojas is a 62y o  year old female who presents with sore throat and difficulty swallowing yesterday  Her symptoms started on Thursday and contacted her PMD  She was advised to go to the ER but at that time she didn't want to  Hence her symptoms worsened and she came to the ER  Moreover her PMD contacted her yesterday that her blood test was positive for Lyme's disease  No similar episodes in the past  Denies n/v/abdominal pain  Has had colonoscopy in 2015 for CRC screening performed by Dr Ramos Massey  Consults    Review of Systems   All other systems reviewed and are negative  Historical Information   Past Medical History:   Diagnosis Date    Disease of thyroid gland     Lyme disease, unspecified      Past Surgical History:   Procedure Laterality Date    TONSILLECTOMY       Social History   History   Alcohol Use    3 6 oz/week    6 Glasses of wine per week     History   Drug Use No     History   Smoking Status    Never Smoker   Smokeless Tobacco    Never Used     Family History: non-contributory    Meds/Allergies   all current active meds have been reviewed    Allergies   Allergen Reactions    Prednisone Swelling    Doxycycline GI Intolerance    Tetanus Immune Globulin        Objective   Vitals:    07/28/18 1541   BP: 144/74   Pulse: 66   Resp: 17   Temp: 98 °F (36 7 °C)   SpO2: 99%       Physical Exam   Constitutional: She is oriented to person, place, and time   She appears well-developed and well-nourished  HENT:   Head: Normocephalic and atraumatic  Eyes: No scleral icterus  Cardiovascular: Normal rate, regular rhythm and normal heart sounds  Pulmonary/Chest: Effort normal and breath sounds normal    Abdominal: Soft  Bowel sounds are normal  She exhibits no distension  There is no tenderness  Neurological: She is alert and oriented to person, place, and time  Lab Results: I have personally reviewed pertinent reports  Imaging Studies: I have personally reviewed pertinent reports  Counseling / Coordination of Care  Total floor / unit time spent today 45 minutes  Greater than 50% of total time was spent with the patient and / or family counseling and / or coordination of care   A description of the counseling / coordination of care: discussed plan with patient and hospitalist

## 2018-07-28 NOTE — ASSESSMENT & PLAN NOTE
· Positive Lyme AB IgM 1 94,negative Western Blot  · Will continue IV doxycycline while in-house  · Patient reports that over the course of her life she has had 2 or 3 prescriptions for doxycycline but has never been able to finish the prescribed course because of severe abdominal pain and discomfort by the 4th or 5th dose     · At the time of discharge will transition the patient to oral amoxicillin for cefuroxime for 21 days

## 2018-07-28 NOTE — CASE MANAGEMENT
Initial Clinical Review    Admission: Date/Time/Statement: Observation 7/27/18 @ 2049     Orders Placed This Encounter   Procedures    Place in Observation     Standing Status:   Standing     Number of Occurrences:   1     Order Specific Question:   Admitting Physician     Answer:   Zulema Maloney [[id number]]     Order Specific Question:   Level of Care     Answer:   Med Surg [16]         ED: Date/Time/Mode of Arrival:   ED Arrival Information     Expected Arrival Acuity Means of Arrival Escorted By Service Admission Type    - 7/27/2018 17:48 Less Urgent Walk-In Spouse General Medicine Urgent    Arrival Complaint    difficulty swallowing      Chief Complaint:   Patient presents with    Sore Throat     started three weeks ago, with cough and feeling as if there is something stuck in her throat       History of Illness: 62 y o female patient presents emergency room with has been with complaints of sore throat and inability to swallow for the past 2 days  Patient states initial symptoms began with a headache intermittently 3 weeks ago  She states she then developed increased fatigue ear pain facial tingling  She also admits to then developing joint aches bilateral knees  Patient denies shortness of breath or chest pain  She went to PCP had blood work obtained which revealed a positive Lyme titer and given a script for doxycycline  Patient states however due to the severe esophagitis she has been unable to keep anything down had 1 episode of vomiting today when trying to take a pill    She presents to emergency room with increased throat soreness which she now feels is going down further into her chest       ED Vital Signs:   ED Triage Vitals [07/27/18 1814]   Temperature Pulse Respirations Blood Pressure SpO2   97 9 °F (36 6 °C) 70 18 141/68 100 %   7        Wt Readings from Last 1 Encounters:   07/27/18 76 kg (167 lb 9 oz)       Abnormal Labs/Diagnostic Test Results: wbc 7 2, hgb 13 9  Strep negative  U/A 1(+) leukocytes     ED Treatment:   Medication Administration from 07/27/2018 1748 to 07/27/2018 2130       Date/Time Order Dose Route     07/27/2018 1924 sodium chloride 0 9 % bolus 1,000 mL 1,000 mL Intravenous     07/27/2018 1923 ketorolac (TORADOL) injection 30 mg 30 mg Intravenous     07/27/2018 2026 doxycycline (VIBRAMYCIN) 100 mg in sodium chloride 0 9 % 100 mL IVPB 100 mg Intravenous          Past Medical/Surgical History: Active Ambulatory Problems     Diagnosis Date Noted    Hypothyroidism 09/14/2013     Resolved Ambulatory Problems     Diagnosis Date Noted    No Resolved Ambulatory Problems     Past Medical History:   Diagnosis Date    Disease of thyroid gland     Lyme disease, unspecified        Admitting Diagnosis: Acute esophagitis [K20 9]  Acute Lyme disease [A69 20]  Difficulty swallowing [R13 10]  Headache [R51]  Arthralgia of both knees [M25 561, M25 562]    Age/Sex: 62 y o  female    Assessment/Plan:   Acute esophagitis   Assessment & Plan     · Possibly related to Lymes disease  · PPI and carafate, if no improvement consider GI consult may need EGD  · Pain control, lozengers          Lyme disease   Assessment & Plan     · Positive Lyme AB IgM 1 94,negative Western Blot  · Continue Doxycycline 100mg BID  · Supportive care  · History of prior lyme diagnosis          Other headache syndrome   Assessment & Plan     · Likely secondary to acute illness  · Supportive care          Myalgia   Assessment & Plan     · Likely secondary to acute illness  · Supportive care                Anticipated Length of Stay:  Patient will be admitted on an Observation basis with an anticipated length of stay of  < 2 midnights     Justification for Hospital Stay: iv antibiotics, supportive care      Admission Orders:  Scheduled Meds:   Current Facility-Administered Medications:  acetaminophen 650 mg Oral Q6H PRN   doxycycline 100 mg Intravenous Q12H   heparin (porcine) 5,000 Units Subcutaneous Q8H [address]   ketorolac 30 mg Intravenous Q6H [address]   ondansetron 4 mg Intravenous Q6H PRN   pantoprazole 40 mg Intravenous Q12H SY   phenol 1 spray Mouth/Throat Q2H PRN   sodium chloride 75 mL/hr Intravenous Continuous   sucralfate 1,000 mg Oral Q6H SY   zolpidem 5 mg Oral HS PRN

## 2018-07-28 NOTE — NURSING NOTE
Patient admitted to room 108-1 ,alert and oriented x 3  Received report from ER nurse  Patient remains in bed, arousable  HRR, no edema  Pt on RA  +BS in all four quadrants  LBM 7/27  IV 22 R wrist, patent and intact  Bed in lowest position and call bell with in reach  Will continue to monitor

## 2018-07-28 NOTE — PLAN OF CARE
DISCHARGE PLANNING     Discharge to home or other facility with appropriate resources Progressing        INFECTION - ADULT     Absence or prevention of progression during hospitalization Progressing        Knowledge Deficit     Patient/family/caregiver demonstrates understanding of disease process, treatment plan, medications, and discharge instructions Progressing        Nutrition/Hydration-ADULT     Nutrient/Hydration intake appropriate for improving, restoring or maintaining nutritional needs Progressing        PAIN - ADULT     Verbalizes/displays adequate comfort level or baseline comfort level Progressing        Potential for Falls     Patient will remain free of falls Progressing        SAFETY ADULT     Patient will remain free of falls Progressing     Maintain or return to baseline ADL function Progressing     Maintain or return mobility status to optimal level Progressing          DISCHARGE PLANNING     Discharge to home or other facility with appropriate resources Progressing        INFECTION - ADULT     Absence or prevention of progression during hospitalization Progressing        Knowledge Deficit     Patient/family/caregiver demonstrates understanding of disease process, treatment plan, medications, and discharge instructions Progressing        Nutrition/Hydration-ADULT     Nutrient/Hydration intake appropriate for improving, restoring or maintaining nutritional needs Progressing        PAIN - ADULT     Verbalizes/displays adequate comfort level or baseline comfort level Progressing        Potential for Falls     Patient will remain free of falls Progressing        SAFETY ADULT     Patient will remain free of falls Progressing     Maintain or return to baseline ADL function Progressing     Maintain or return mobility status to optimal level Progressing

## 2018-07-28 NOTE — ASSESSMENT & PLAN NOTE
· Positive Lyme AB IgM 1 94,negative Western Blot  · Continue Doxycycline 100mg BID  · Supportive care  · History of prior lyme diagnosis

## 2018-07-28 NOTE — H&P
H&P- Judy Lainez 1960, 62 y o  female MRN: [id number]    Unit/Bed#: -01 Encounter: [id number]    Primary Care Provider: LORAINE Dhaliwal   Date and time admitted to hospital: 7/27/2018  6:22 PM        * Acute esophagitis   Assessment & Plan    · Possibly related to Lymes disease  · PPI and carafate, if no improvement consider GI consult may need EGD  · Pain control, lozengers        Lyme disease   Assessment & Plan    · Positive Lyme AB IgM 1 94,negative Western Blot  · Continue Doxycycline 100mg BID  · Supportive care  · History of prior lyme diagnosis        Other headache syndrome   Assessment & Plan    · Likely secondary to acute illness  · Supportive care        Myalgia   Assessment & Plan    · Likely secondary to acute illness  · Supportive care          VTE Prophylaxis: Heparin   Code Status: full code  POLST: POLST is not applicable to this patient  Discussion with family: patient    Anticipated Length of Stay:  Patient will be admitted on an Observation basis with an anticipated length of stay of  < 2 midnights  Justification for Hospital Stay: iv antibiotics, supportive care    Total Time for Visit, including Counseling / Coordination of Care: 45 minutes  Greater than 50% of this total time spent on direct patient counseling and coordination of care  Chief Complaint:   Sore throat, headache    History of Present Illness:    Judy Lainez is a 62 y o  female who presents with sore throat  Patient with intermittent headache for 3 weeks, she also noted fatigue, ear pain, facial tingling, bilateral knee pain and right wrist pain  Today had increased throat pain traveling into chest   Patient was seen by PCP on Tuesday had blood work with Positive Lyme AB at 1 94  She was given script for antibiotic today and was not able to tolerate pill and vomited x 1 today  + headache, nausea, no vomiting  Feels nauseated w/ swallowing, no post nasal drainage    Has only been doing juicing and liquids secondary to discomfort  Feels a burning sensation but is a little better w/ Toradol  Patient is reluctant to try medications because she is sensitive to things  Review of Systems:    Review of Systems   Constitutional: Positive for chills and fatigue  HENT: Positive for ear pain and sore throat  Eyes: Negative for visual disturbance  Respiratory: Positive for choking  Negative for shortness of breath  Cardiovascular: Negative  Negative for chest pain  Gastrointestinal: Positive for nausea  Negative for abdominal pain, diarrhea and vomiting  Endocrine: Negative  Genitourinary: Negative  Musculoskeletal: Positive for arthralgias  Skin: Negative  Neurological: Positive for headaches  Negative for dizziness, facial asymmetry, speech difficulty, weakness and light-headedness  Psychiatric/Behavioral: Negative for confusion  Past Medical and Surgical History:     Past Medical History:   Diagnosis Date    Disease of thyroid gland     Lyme disease, unspecified        Past Surgical History:   Procedure Laterality Date    TONSILLECTOMY         Meds/Allergies:    Prior to Admission medications    Medication Sig Start Date End Date Taking? Authorizing Provider   Hormone Cream Base CREA by Does not apply route   Yes Historical Provider, MD   thyroid (ANA THYROID) 90 MG tablet Take by mouth   Yes Historical Provider, MD     I have reviewed home medications with patient personally  Allergies:    Allergies   Allergen Reactions    Prednisone Swelling    Doxycycline GI Intolerance    Tetanus Immune Globulin        Social History:     Marital Status: /Civil Union   Occupation: massage therapist  Patient Pre-hospital Living Situation: home w/   Patient Pre-hospital Level of Mobility: mobile  Patient Pre-hospital Diet Restrictions: vegetarian  Substance Use History:   History   Alcohol Use    3 6 oz/week    6 Glasses of wine per week     History   Smoking Status    Never Smoker   Smokeless Tobacco    Never Used     History   Drug Use No       Family History:  See Family History Admission Tab    Physical Exam:     Vitals:   Blood Pressure: 143/72 (07/27/18 2124)  Pulse: 70 (07/27/18 2135)  Temperature: (!) 97 1 °F (36 2 °C) (07/27/18 2135)  Temp Source: Tympanic (07/27/18 2135)  Respirations: 18 (07/27/18 2135)  Height: 5' 4" (162 6 cm) (07/27/18 2135)  Weight - Scale: 76 kg (167 lb 9 oz) (07/27/18 2135)  SpO2: 98 % (07/27/18 2135)    Physical Exam   Constitutional: She is oriented to person, place, and time  She appears well-developed and well-nourished  HENT:   Head: Normocephalic and atraumatic  Mouth/Throat: No oropharyngeal exudate  + erythema pharynx   Eyes: Conjunctivae and EOM are normal  Right eye exhibits no discharge  Left eye exhibits no discharge  Neck: Normal range of motion  No tracheal deviation present  Cardiovascular: Normal rate, regular rhythm and normal heart sounds  Exam reveals no gallop and no friction rub  No murmur heard  Pulmonary/Chest: Effort normal and breath sounds normal  No respiratory distress  She has no wheezes  She has no rales  She exhibits no tenderness  Abdominal: Soft  Bowel sounds are normal  She exhibits no distension and no mass  There is no tenderness  There is no rebound and no guarding  Musculoskeletal: Normal range of motion  She exhibits no edema, tenderness or deformity  Neurological: She is alert and oriented to person, place, and time  Skin: Skin is warm and dry  No rash noted  No erythema  No pallor  Psychiatric: She has a normal mood and affect  Her behavior is normal  Judgment and thought content normal    Nursing note and vitals reviewed  Additional Data:     Lab Results: I have personally reviewed pertinent reports          Results from last 7 days  Lab Units 07/27/18  1918   WBC Thousand/uL 7 20   HEMOGLOBIN g/dL 13 9   HEMATOCRIT % 41 0   PLATELETS Thousands/uL 234   NEUTROS PCT % 58   LYMPHS PCT % 30   MONOS PCT % 10   EOS PCT % 2       Results from last 7 days  Lab Units 07/24/18  1148   SODIUM mmol/L 137   POTASSIUM mmol/L 4 6   CHLORIDE mmol/L 104   CO2 mmol/L 30   BUN mg/dL 14   CREATININE mg/dL 0 71   CALCIUM mg/dL 9 4   TOTAL PROTEIN g/dL 7 8   BILIRUBIN TOTAL mg/dL 0 50   ALK PHOS U/L 57   ALT U/L 34   AST U/L 24       Results from last 7 days  Lab Units 07/27/18  1918   INR  1 04         Imaging: no new imaging    No orders to display       EKG, Pathology, and Other Studies Reviewed on Admission:   · EKG: none available    Allscripts / Epic Records Reviewed: Yes     ** Please Note: This note has been constructed using a voice recognition system   **

## 2018-07-29 VITALS
RESPIRATION RATE: 18 BRPM | HEART RATE: 61 BPM | OXYGEN SATURATION: 96 % | HEIGHT: 64 IN | TEMPERATURE: 96.5 F | WEIGHT: 173 LBS | BODY MASS INDEX: 29.53 KG/M2 | SYSTOLIC BLOOD PRESSURE: 147 MMHG | DIASTOLIC BLOOD PRESSURE: 75 MMHG

## 2018-07-29 LAB
ANION GAP SERPL CALCULATED.3IONS-SCNC: 6 MMOL/L (ref 4–13)
BACTERIA THROAT CULT: NORMAL
BASOPHILS # BLD AUTO: 0 THOUSANDS/ΜL (ref 0–0.1)
BASOPHILS NFR BLD AUTO: 1 % (ref 0–2)
BUN SERPL-MCNC: 10 MG/DL (ref 7–25)
CALCIUM SERPL-MCNC: 9 MG/DL (ref 8.6–10.5)
CHLORIDE SERPL-SCNC: 110 MMOL/L (ref 98–107)
CO2 SERPL-SCNC: 24 MMOL/L (ref 21–31)
CREAT SERPL-MCNC: 0.61 MG/DL (ref 0.6–1.2)
EOSINOPHIL # BLD AUTO: 0.2 THOUSAND/ΜL (ref 0–0.61)
EOSINOPHIL NFR BLD AUTO: 3 % (ref 0–5)
ERYTHROCYTE [DISTWIDTH] IN BLOOD BY AUTOMATED COUNT: 14.2 % (ref 11.5–14.5)
GFR SERPL CREATININE-BSD FRML MDRD: 100 ML/MIN/1.73SQ M
GLUCOSE SERPL-MCNC: 107 MG/DL (ref 65–99)
HCT VFR BLD AUTO: 34.9 % (ref 34.8–46.1)
HGB BLD-MCNC: 11.8 G/DL (ref 12–16)
LYMPHOCYTES # BLD AUTO: 1.8 THOUSANDS/ΜL (ref 0.6–4.47)
LYMPHOCYTES NFR BLD AUTO: 40 % (ref 21–51)
MCH RBC QN AUTO: 29.7 PG (ref 26–34)
MCHC RBC AUTO-ENTMCNC: 33.7 G/DL (ref 31–37)
MCV RBC AUTO: 88 FL (ref 81–99)
MONOCYTES # BLD AUTO: 0.4 THOUSAND/ΜL (ref 0.17–1.22)
MONOCYTES NFR BLD AUTO: 9 % (ref 2–12)
NEUTROPHILS # BLD AUTO: 2.1 THOUSANDS/ΜL (ref 1.4–6.5)
NEUTS SEG NFR BLD AUTO: 46 % (ref 42–75)
NRBC BLD AUTO-RTO: 0 /100 WBCS
PLATELET # BLD AUTO: 176 THOUSANDS/UL (ref 149–390)
PMV BLD AUTO: 8.9 FL (ref 8.6–11.7)
POTASSIUM SERPL-SCNC: 3.6 MMOL/L (ref 3.5–5.5)
RBC # BLD AUTO: 3.96 MILLION/UL (ref 3.9–5.2)
SODIUM SERPL-SCNC: 140 MMOL/L (ref 134–143)
WBC # BLD AUTO: 4.5 THOUSAND/UL (ref 4.8–10.8)

## 2018-07-29 PROCEDURE — 85025 COMPLETE CBC W/AUTO DIFF WBC: CPT | Performed by: HOSPITALIST

## 2018-07-29 PROCEDURE — 80048 BASIC METABOLIC PNL TOTAL CA: CPT | Performed by: HOSPITALIST

## 2018-07-29 PROCEDURE — 99217 PR OBSERVATION CARE DISCHARGE MANAGEMENT: CPT | Performed by: HOSPITALIST

## 2018-07-29 RX ORDER — DOXYCYCLINE HYCLATE 100 MG/1
100 CAPSULE ORAL EVERY 12 HOURS SCHEDULED
Status: DISCONTINUED | OUTPATIENT
Start: 2018-07-29 | End: 2018-07-29 | Stop reason: HOSPADM

## 2018-07-29 RX ORDER — PANTOPRAZOLE SODIUM 40 MG/1
40 TABLET, DELAYED RELEASE ORAL
Qty: 30 TABLET | Refills: 0 | Status: SHIPPED | OUTPATIENT
Start: 2018-07-29

## 2018-07-29 RX ORDER — DOXYCYCLINE HYCLATE 100 MG/1
100 CAPSULE ORAL EVERY 12 HOURS SCHEDULED
Qty: 52 CAPSULE | Refills: 0 | Status: SHIPPED | OUTPATIENT
Start: 2018-07-29 | End: 2018-08-24

## 2018-07-29 RX ORDER — PANTOPRAZOLE SODIUM 40 MG/1
40 TABLET, DELAYED RELEASE ORAL
Status: DISCONTINUED | OUTPATIENT
Start: 2018-07-29 | End: 2018-07-29 | Stop reason: HOSPADM

## 2018-07-29 RX ADMIN — SUCRALFATE 1000 MG: 1 SUSPENSION ORAL at 06:14

## 2018-07-29 RX ADMIN — DOXYCYCLINE 100 MG: 100 INJECTION, POWDER, LYOPHILIZED, FOR SOLUTION INTRAVENOUS at 08:35

## 2018-07-29 RX ADMIN — SODIUM CHLORIDE 125 ML/HR: 9 INJECTION, SOLUTION INTRAVENOUS at 06:44

## 2018-07-29 RX ADMIN — KETOROLAC TROMETHAMINE 30 MG: 30 INJECTION, SOLUTION INTRAMUSCULAR at 06:12

## 2018-07-29 NOTE — DISCHARGE INSTRUCTIONS
·   Esophagitis   WHAT YOU NEED TO KNOW:   What is esophagitis? Esophagitis is inflammation or irritation of the lining of the esophagus  What causes esophagitis? The most common cause is acid reflux  This means stomach acid backs up into your esophagus  The following can also cause esophagitis:  An infection from bacteria, a virus, or a fungus    Vomiting or a hiatal hernia    Medicines such as aspirin or NSAIDs    Large pills taken without enough water or right before you go to bed    Cancer treatment, such as radiation    A toxic object you swallowed, such as a button battery, that gets stuck in your esophagus    Too much caffeine or acidic or spicy foods    Cigarette smoking  What are the signs and symptoms of esophagitis? Signs and symptoms depend on the cause of your esophagitis  You may have any of the following:  Pain in the middle of your chest that may spread to your back    Burning or pain in your esophagus, abdominal pain, or indigestion    Trouble swallowing, or pain when you swallow    A feeling that something is stuck in your esophagus    Sore throat, a cough, or hoarseness    Gagging, drooling, or wheezing    Mouth sores (white patches), or a bad taste in your mouth or bad breath    Nausea or vomiting    Feeding problems or failure to thrive (young children)  How is esophagitis diagnosed? Your healthcare provider will ask about your symptoms and when they started  Tell him if anything makes your symptoms worse or better  You may need any of the following: An endoscopy  is a procedure used to look at your esophagus and stomach  Your healthcare provider will use an endoscope (tube with a camera and light on the end)  He may also take a tissue sample during the procedure  The sample may show if your esophagus was damaged by what is causing your esophagitis  A barium swallow  is done to show if your esophagus was damaged and how badly it was damaged   X-rays are taken after you swallow barium liquid  Barium liquid is used to help damage show up on the x-ray  How is esophagitis treated? The goal of treatment is to help the lining of your esophagus heal and to prevent serious complications  Treatment will depend on what is causing your esophagitis  Symptoms caused by a toxic object such as a button battery need immediate treatment  Less severe causes may not need treatment  You may need any of the following if symptoms continue or get worse:  Medicines  may be given to fight infection or to control stomach acid  Your healthcare provider may make changes to your medicines, such as changing it to a liquid form  An elimination diet  may help you find foods that are causing your symptoms  You will stop eating certain foods that can cause esophagitis  Your healthcare provider will tell you to start eating them again one at a time  Each time you do not have symptoms, you will start eating another food from the list  Any food that does cause symptoms may be causing your esophagitis  Surgery  may be needed if other treatments do not work  Part of your stomach can be wrapped to cover the valve between your stomach and esophagus  This helps prevent acid from backing up into your esophagus  What can I do to manage or prevent esophagitis? Do not smoke  Nicotine and other chemicals in cigarettes and cigars can cause blood vessel and lung damage  Ask your healthcare provider for information if you currently smoke and need help to quit  E-cigarettes or smokeless tobacco still contain nicotine  Talk to your healthcare provider before you use these products  Do not drink alcohol  Alcohol can irritate your esophagus  Talk to your healthcare provider if you need help to stop drinking  Limit or do not eat foods that can lead to esophagitis  Foods such as oranges and salsa can irritate your esophagus  Caffeine and chocolate can cause acid reflux   High-fat and fried foods make your stomach digest food more slowly  This increases the amount of stomach acid your esophagus is exposed to  Eat small meals, and drink water with your meals  Soft foods such as yogurt and applesauce may help soothe your throat  Do not eat for at least 3 hours before you go to bed  Keep batteries and similar objects out of the reach of children  Babies often put items in their mouths to explore them  Button batteries are easy to swallow and can cause serious damage  Keep the battery covers of electronic devices such as remote controls taped closed  Store all batteries and toxic materials where children cannot get to them  Use childproof locks to keep children away from dangerous materials  Drink more liquid when you take pills  Drink a full glass of water when you take your pills  Ask your healthcare provider if you can take your pills at least an hour before you go to bed  Prevent acid reflux  Do not bend over unless it is necessary  Acid may back up into your esophagus when you bend over  If possible, keep the head of your bed elevated while you sleep  This will help keep acid from backing up  Manage stress  Stress can make your symptoms worse or cause stomach acid to back up  Call 911 for any of the following: You have chest pain that does not go away within a few minutes or gets worse  When should I seek immediate care? You feel like you have food stuck in your throat and you cannot cough it out  When should I contact my healthcare provider? You have new or worsening symptoms, even after treatment  You have questions or concerns about your condition or care  CARE AGREEMENT:   You have the right to help plan your care  Learn about your health condition and how it may be treated  Discuss treatment options with your caregivers to decide what care you want to receive  You always have the right to refuse treatment  The above information is an  only   It is not intended as medical advice for individual conditions or treatments  Talk to your doctor, nurse or pharmacist before following any medical regimen to see if it is safe and effective for you  © 2017 [address] [employer]  Information is for End User's use only and may not be sold, redistributed or otherwise used for commercial purposes  All illustrations and images included in CareNotes® are the copyrighted property of A D A M , Inc  or Alvin Carrion  ·   Lyme Disease   WHAT YOU NEED TO KNOW:   What is Lyme disease? Lyme disease is a bacterial infection caused by the bite of an infected tick  What increases my risk for Lyme disease? You work in a wooded area or area with heavy brush    You travel to or live in areas where ticks are common    You hunt, camp, or fish in a wooded area  What are the signs and symptoms of Lyme disease? A red rash that looks like a target or bull's eye    Fever, chills, or sore throat    Weakness and tiredness    Headache or muscle aches    Joint pain    Abdominal pain, nausea, or diarrhea  How is Lyme disease diagnosed? Your healthcare provider will examine you and ask about your symptoms  Tell him if you live or work in a wooded area or have been hunting or camping  You may need any of the following:  Blood tests  may show the bacteria that cause Lyme disease  A sample of joint fluid  may be tested for the bacteria that cause Lyme disease  How is Lyme disease treated? You may need any of the following:  Antibiotics  treat a bacterial infection  NSAIDs , such as ibuprofen, help decrease swelling, pain, and fever  This medicine is available with or without a doctor's order  NSAIDs can cause stomach bleeding or kidney problems in certain people  If you take blood thinner medicine, always ask your healthcare provider if NSAIDs are safe for you  Always read the medicine label and follow directions  How can I prevent a tick bite? Ticks live in areas covered by brush and grass   They may even be found in your lawn if you live in certain areas  Outdoor pets can carry ticks inside the house  Ticks can grab onto you or your clothes when you walk by grass or brush  If you go into areas that contain many trees, tall grasses, and underbrush, do the following:  Wear light colored pants and a long-sleeved shirt  Tuck your pants into your socks or boots  Tuck in your shirt  Wear sleeves that fit close to the skin at your wrists and neck  This will help prevent ticks from crawling through gaps in your clothing and onto your skin  Wear a hat in areas with trees  Apply insect repellant on your skin  The insect repellant should contain DEET  Do not put insect repellant on skin that is cut, scratched, or irritated  Always use soap and water to wash the insect repellant off as soon as possible once you are indoors  Do not apply insect repellant on your child's face or hands  Spray insect repellant onto your clothes  Use permethrin spray  This spray kills ticks that crawl on your clothing  Be sure to spray the tops of your boots, bottom of pant legs, and sleeve cuffs  As soon as possible, wash and dry clothing in hot water and high heat  Check your and your child's clothing, hair, and skin for ticks  Shower within 2 hours of coming indoors  Carefully check the hairline, armpits, neck, and waist      Decrease the risk for ticks in your yard  Ticks like to live in shady, moist areas  Robet Overlie your lawn regularly to keep the grass short  Trim the grass around birdbaths and fences  Cut branches that are overgrown and take them out of the yard  Clear out leaf piles  [place] [employer] in a dry, jessica area  Treat pets with tick control products  as directed  This will decrease your risk for a tick bite  Check your pets for ticks  Remove ticks from pets the same way as you remove them from people  Ask your pet's  about the best product to use on your pet  Remove a tick with tweezers  Wear gloves   Grasp the tick as close to your skin as possible  Pull the tick straight up and out  Do not touch the tick with your bare hands  Check to make sure you removed the whole tick, including the head  Clean the area with soap and water or rubbing alcohol  Then wash your hands with soap and water  Where can I find more information? Centers for Disease Control and Prevention (CDC)  [address] , [address]  Phone: 5- 308 - [phone]  Web Address: [facility] it  Call 46 for any of the following: Your heart is beating faster than usual and you feel dizzy  You have chest pain or trouble breathing  You suddenly cannot talk or see well, or you have trouble moving an area of your body  When should I seek immediate care? You have a headache and a stiff neck  You have trouble concentrating or thinking clearly  You have numbness or tingling in your arms or legs, or you have trouble walking  When should I contact my healthcare provider? Your rash grows or spreads to other areas of your body  You suddenly have trouble falling or staying asleep  You have new or worsening pain and swelling in your joints  You have new or worsening weakness and muscle pain  You have a new tick bite  You have questions or concerns about your condition or care  CARE AGREEMENT:   You have the right to help plan your care  Learn about your health condition and how it may be treated  Discuss treatment options with your caregivers to decide what care you want to receive  You always have the right to refuse treatment  The above information is an  only  It is not intended as medical advice for individual conditions or treatments  Talk to your doctor, nurse or pharmacist before following any medical regimen to see if it is safe and effective for you    © 2017 [employer] Information is for End User's use only and may not be sold, redistributed or otherwise used for commercial purposes  All illustrations and images included in CareNotes® are the copyrighted property of A D A M , Inc  or Alvin Carrion    · Please complete prescribed course of antibiotics  · If you have difficulty completing course please contact your primary care physician  · Please to follow up with Gastroenterology

## 2018-07-29 NOTE — DISCHARGE SUMMARY
Discharge- Leroy Dany 1960, 62 y o  female MRN: [id number]    Unit/Bed#: -01 Encounter: [id number]    Primary Care Provider: LORAINE Smith   Date and time admitted to hospital: 7/27/2018  6:22 PM        * Acute esophagitis   Assessment & Plan    · Has significantly improved  · Patient is status post a GI evaluation with home patient will follow up as an outpatient and will be evaluated for possible endoscopy  · She is tolerating a diet well  · DC IV Protonix, DC IV doxycycline   · Will DC home on oral Protonix and oral doxycycline  · Patient would like to give doxycycline the 2nd try however states that she will be in touch with her primary care physician if she has recurrent GI upset with that medication  · She does need to complete a total of 26 more days        Lyme disease   Assessment & Plan    · Positive Lyme AB IgM 1 94,negative Western Blot  · DC home on oral doxycycline as outlined above  · Outpatient follow-up with PCP and plus or minus infectious Disease if necessary         Other headache syndrome   Assessment & Plan    · Resolved            Discharging Physician / Practitioner: Lalitha Hoffman MD  PCP: Nichol Roberts, [address] [place] [address]  Admission Date:   Admission Orders     Ordered        07/27/18 2049  Place in Observation  Once         07/27/18 2037  Inpatient Admission (expected length of stay for this patient is greater than two midnights)  Once         07/27/18 2036  Inpatient Admission  Once             Discharge Date: 07/29/18    Resolved Problems  Date Reviewed: 7/27/2018    None          Consultations During Hospital Stay:  · Gastroenterology    Procedures Performed:   · None    Significant Findings / Test Results:   · None    Incidental Findings:   · None     Test Results Pending at Discharge (will require follow up):    · None     Outpatient Tests Requested:  · None    Complications:  None    Reason for Admission:  Sore throat, difficulty swallowing, headaches  Hospital Course:     Isabela Ramírez is a 62 y o  female patient who originally presented to the hospital on 7/27/2018 due to dysphagia and odynophagia  Patient was admitted up to the [facility] surgical floors  She was initially started on clear liquid diet  She was started on IV Protonix and IV doxycycline  She was recently diagnosed with Lyme disease  GI consultation was obtained  They will follow up as an outpatient for the possibility of an endoscopy  Patient's diet was advanced as tolerated  She was doing well on the morning of 07/29/2018  At that time, patient was ready for discharge  She will be discharged on 26 more days worth of doxycycline  Of note, patient has had difficulty completing courses of doxycycline in the past   She states that she had abdominal discomfort  I offered her the alternatives which would include amoxicillin or cefuroxime  Patient declined  She wants to try doxycycline again since that is the first-line agent  If she does not tolerate doxycycline, she will get in touch with her primary care physician who will then need to change it  Additionally, she was discharged on protonix  She will follow up with both her primary care physician and GI as an outpatient  If necessary, her PCP will refer her to infectious disease for any additional workup  Please see above list of diagnoses and related plan for additional information  Condition at Discharge: good     Discharge Day Visit / Exam:     Subjective:  Patient seen and examined, no complaints no distress  Vitals: Blood Pressure: 147/75 (07/29/18 0713)  Pulse: 61 (07/29/18 0713)  Temperature: (!) 96 5 °F (35 8 °C) (07/29/18 0713)  Temp Source: Tympanic (07/29/18 0713)  Respirations: 18 (07/29/18 0713)  Height: 5' 4" (162 6 cm) (07/28/18 0854)  Weight - Scale: 78 5 kg (173 lb) (07/29/18 0600)  SpO2: 96 % (07/29/18 0713)  Exam:   Physical Exam   Constitutional: She is oriented to person, place, and time   She appears well-developed and well-nourished  HENT:   Head: Normocephalic and atraumatic  Nose: Nose normal    Mouth/Throat: Oropharynx is clear and moist    Eyes: Conjunctivae and EOM are normal  Pupils are equal, round, and reactive to light  Neck: Normal range of motion  Neck supple  No JVD present  No thyromegaly present  Cardiovascular: Normal rate, regular rhythm and intact distal pulses  Exam reveals no gallop and no friction rub  No murmur heard  Pulmonary/Chest: Effort normal and breath sounds normal  No respiratory distress  Abdominal: Soft  Bowel sounds are normal  She exhibits no distension and no mass  There is no tenderness  There is no guarding  Musculoskeletal: Normal range of motion  She exhibits no edema  Lymphadenopathy:     She has no cervical adenopathy  Neurological: She is alert and oriented to person, place, and time  No cranial nerve deficit  Skin: Skin is warm  No rash noted  No erythema  Psychiatric: She has a normal mood and affect  Her behavior is normal    Nursing note and vitals reviewed  Discussion with Family:  Patient's     Discharge instructions/Information to patient and family:   See after visit summary for information provided to patient and family  Provisions for Follow-Up Care:  See after visit summary for information related to follow-up care and any pertinent home health orders  Disposition:     Home    For Discharges to [facility] SNF:   · Not Applicable to this Patient - Not Applicable to this Patient    Planned Readmission:  None     Discharge Statement:  I spent 35 minutes discharging the patient  This time was spent on the day of discharge  I had direct contact with the patient on the day of discharge  Greater than 50% of the total time was spent examining patient, answering all patient questions, arranging and discussing plan of care with patient as well as directly providing post-discharge instructions    Additional time then spent on discharge activities  Discharge Medications:  See after visit summary for reconciled discharge medications provided to patient and family        ** Please Note: This note has been constructed using a voice recognition system **

## 2018-07-29 NOTE — PLAN OF CARE
DISCHARGE PLANNING     Discharge to home or other facility with appropriate resources Adequate for Discharge        INFECTION - ADULT     Absence or prevention of progression during hospitalization Adequate for Discharge        Knowledge Deficit     Patient/family/caregiver demonstrates understanding of disease process, treatment plan, medications, and discharge instructions Adequate for Discharge        Nutrition/Hydration-ADULT     Nutrient/Hydration intake appropriate for improving, restoring or maintaining nutritional needs Adequate for Discharge        PAIN - ADULT     Verbalizes/displays adequate comfort level or baseline comfort level Adequate for Discharge        Potential for Falls     Patient will remain free of falls Adequate for Discharge        SAFETY ADULT     Patient will remain free of falls Adequate for Discharge     Maintain or return to baseline ADL function Adequate for Discharge     Maintain or return mobility status to optimal level Adequate for Discharge

## 2018-07-29 NOTE — ASSESSMENT & PLAN NOTE
· Has significantly improved  · Patient is status post a GI evaluation with home patient will follow up as an outpatient and will be evaluated for possible endoscopy  · She is tolerating a diet well  · DC IV Protonix, DC IV doxycycline   · Will DC home on oral Protonix and oral doxycycline  · Patient would like to give doxycycline the 2nd try however states that she will be in touch with her primary care physician if she has recurrent GI upset with that medication  · She does need to complete a total of 26 more days

## 2018-07-29 NOTE — ASSESSMENT & PLAN NOTE
· Positive Lyme AB IgM 1 94,negative Western Blot  · DC home on oral doxycycline as outlined above  · Outpatient follow-up with PCP and plus or minus infectious Disease if necessary

## 2018-07-30 LAB
CMV IGG SERPL IA-ACNC: >10 U/ML (ref 0–0.59)
CMV IGM SERPL IA-ACNC: <30 AU/ML (ref 0–29.9)
EBV EA IGG SER-ACNC: 64.3 U/ML (ref 0–8.9)
EBV NA IGG SER IA-ACNC: >600 U/ML (ref 0–17.9)
EBV PATRN SPEC IB-IMP: ABNORMAL
EBV VCA IGG SER IA-ACNC: >600 U/ML (ref 0–17.9)
EBV VCA IGM SER IA-ACNC: <36 U/ML (ref 0–35.9)

## 2018-07-30 NOTE — ED PROVIDER NOTES
History  Chief Complaint   Patient presents with    Sore Throat     started three weeks ago, with cough and feeling as if there is something stuck in her throat     HPI    Prior to Admission Medications   Prescriptions Last Dose Informant Patient Reported? Taking? Hormone Cream Base CREA 7/27/2018 at Unknown time  Yes Yes   Sig: by Does not apply route   thyroid (ARMOUR THYROID) 90 MG tablet 7/27/2018 at Unknown time  Yes Yes   Sig: Take by mouth      Facility-Administered Medications: None       Past Medical History:   Diagnosis Date    Disease of thyroid gland     Lyme disease, unspecified        Past Surgical History:   Procedure Laterality Date    TONSILLECTOMY         Family History   Problem Relation Age of Onset    COPD Mother     Cancer Father      I have reviewed and agree with the history as documented      Social History   Substance Use Topics    Smoking status: Never Smoker    Smokeless tobacco: Never Used    Alcohol use 3 6 oz/week     6 Glasses of wine per week        Review of Systems    Physical Exam  Physical Exam    Vital Signs  ED Triage Vitals [07/27/18 1814]   Temperature Pulse Respirations Blood Pressure SpO2   97 9 °F (36 6 °C) 70 18 141/68 100 %      Temp Source Heart Rate Source Patient Position - Orthostatic VS BP Location FiO2 (%)   Temporal Monitor Sitting Left arm --      Pain Score       7           Vitals:    07/28/18 0710 07/28/18 1541 07/28/18 2258 07/29/18 0713   BP: 138/78 144/74 137/74 147/75   Pulse: 62 66 63 61   Patient Position - Orthostatic VS:  Lying Lying        Visual Acuity      ED Medications  Medications   sodium chloride 0 9 % bolus 1,000 mL (0 mL Intravenous Stopped 7/27/18 2109)   ketorolac (TORADOL) injection 30 mg (30 mg Intravenous Given 7/27/18 1923)   doxycycline (VIBRAMYCIN) 100 mg in sodium chloride 0 9 % 100 mL IVPB (0 mg Intravenous Stopped 7/27/18 2055)       Diagnostic Studies  Results Reviewed     Procedure Component Value Units Date/Time EBV acute panel [[id number]]  (Abnormal) Collected:  07/27/18 1918    Lab Status:  Final result Specimen:  Blood from Arm, Left Updated:  07/30/18 1806     EBV Early Antigen Ab, IgG 64 3 (H) U/mL      EBV VCA IgG >600 0 (H) U/mL      EBV VCA IgM <36 0 U/mL      EBV Nuclear Ag Ab >600 0 (H) U/mL      EBV Interp  Comment    Narrative:       Performed at:  [address] [place] [address]  [id number]  : Araceli Cedeno MD, Phone:  [phone]    Blood culture #1 [[id number]] Collected:  07/27/18 1918    Lab Status:  Preliminary result Specimen:  Blood from Hand, Right Updated:  07/29/18 2301     Blood Culture No Growth at 48 hrs  Blood culture #2 [[id number]] Collected:  07/27/18 1918    Lab Status:  Preliminary result Specimen:  Blood from Arm, Left Updated:  07/29/18 2301     Blood Culture No Growth at 48 hrs      Throat culture [[id number]] Collected:  07/27/18 2100    Lab Status:  Final result Specimen:  Throat from Throat Updated:  07/29/18 0735     Throat Culture Negative for beta-hemolytic Streptococcus    Rapid Strep A Screen With Reflex to Culture, Pediatrics and Compromised Adults [[id number]]  (Normal) Collected:  07/27/18 2100    Lab Status:  Final result Specimen:  Throat from Throat Updated:  07/27/18 2114     Rapid Strep A Screen Negative    Sedimentation rate, automated [[id number]]  (Normal) Collected:  07/27/18 1918    Lab Status:  Final result Specimen:  Blood from Arm, Left Updated:  07/27/18 2004     Sed Rate 8 mm/hour     Urine Microscopic [[id number]]  (Abnormal) Collected:  07/27/18 1946    Lab Status:  Final result Specimen:  Urine from Urine, Clean Catch Updated:  07/27/18 2002     RBC, UA None Seen /hpf      WBC, UA 4-10 (A) /hpf      Epithelial Cells None Seen /hpf      Bacteria, UA Occasional /hpf     UA w Reflex to Microscopic w Reflex to Culture [[id number]]  (Abnormal) Collected:  07/27/18 1946    Lab Status:  Final result Specimen:  Urine from Urine, Clean Catch Updated: 07/27/18 1959     Color, UA Yellow     Clarity, UA Clear     Specific Gravity, UA <=1 005 (L)     pH, UA 7 0     Leukocytes, UA 1+ (A)     Nitrite, UA Negative     Protein, UA Negative mg/dl      Glucose, UA Negative mg/dl      Ketones, UA Negative mg/dl      Urobilinogen, UA 0 2 E U /dl      Bilirubin, UA Negative     Blood, UA Negative    Protime-INR [[id number]]  (Normal) Collected:  07/27/18 1918    Lab Status:  Final result Specimen:  Blood from Arm, Left Updated:  07/27/18 1942     Protime 12 1 seconds      INR 1 04    APTT [[id number]]  (Normal) Collected:  07/27/18 1918    Lab Status:  Final result Specimen:  Blood from Arm, Left Updated:  07/27/18 1942     PTT 31 seconds     CBC and differential [[id number]] Collected:  07/27/18 1918    Lab Status:  Final result Specimen:  Blood from Arm, Left Updated:  07/27/18 1927     WBC 7 20 Thousand/uL      RBC 4 72 Million/uL      Hemoglobin 13 9 g/dL      Hematocrit 41 0 %      MCV 87 fL      MCH 29 5 pg      MCHC 34 0 g/dL      RDW 14 2 %      MPV 8 7 fL      Platelets 855 Thousands/uL      nRBC 0 /100 WBCs      Neutrophils Relative 58 %      Lymphocytes Relative 30 %      Monocytes Relative 10 %      Eosinophils Relative 2 %      Basophils Relative 1 %      Neutrophils Absolute 4 20 Thousands/µL      Lymphocytes Absolute 2 20 Thousands/µL      Monocytes Absolute 0 70 Thousand/µL      Eosinophils Absolute 0 10 Thousand/µL      Basophils Absolute 0 10 Thousands/µL     CMV IgG/IgM Antibodies [[id number]] Collected:  07/27/18 1918    Lab Status: In process Specimen:  Blood from Arm, Left Updated:  07/27/18 1924                 No orders to display              Procedures  Procedures       Phone Contacts  ED Phone Contact    ED Course   THE CHART HAS BEEN REVIEWED TO INSURE THAT THE PROVIDER OF RECORD DOCUMENTED RESULTS OF EBV STUDIES  EBV STUDIES WERE NOTED IN THIS PROVIDERS IN BASKET                              MDM  CritCare Time    Disposition  Final diagnoses:   Acute esophagitis   Headache   Arthralgia of both knees   Acute Lyme disease suspected     Time reflects when diagnosis was documented in both MDM as applicable and the Disposition within this note     Time User Action Codes Description Comment    7/27/2018  8:33 PM Marilin Glatter Add [A69 20] Lyme disease, acute     7/27/2018  8:34 PM Hunter Reap M Add [K20 9] Esophagitis, unspecified     7/27/2018  8:34 PM Hunter Reap M Modify [K20 9] Esophagitis, unspecified     7/27/2018  8:34 PM Hunter Reap M Remove [A69 20] Lyme disease, acute     7/27/2018  8:34 PM Hunter Reap M Add [K20 9] Acute esophagitis     7/27/2018  8:34 PM Hunter Reap M Add [R51] Headache     7/27/2018  8:35 PM Hunter Reap M Add [P85 411,  M25 562] Arthralgia of both knees     7/27/2018  8:35 PM Marilin Glatter Modify [K20 9] Acute esophagitis     7/27/2018  8:35 PM Marilin Glatter Remove [K20 9] Esophagitis, unspecified     7/27/2018  8:35 PM Hunter Reap M Add [A69 20] Acute Lyme disease     7/27/2018  8:35 PM Hunter Reap M Modify [A69 20] Acute Lyme disease suspected     7/29/2018  8:53 AM Ree Orta Add [A69 20] Lyme disease       ED Disposition     ED Disposition Condition Comment    Admit  Case was discussed with hospitalist, [facility] and the patient's admission status was agreed to be Admission Status: inpatient status to the service of Dr Roger Hector           Follow-up Information     Follow up With Specialties Details Why [address], [address] [facility], Nurse Practitioner Follow up in 1 day(s)  [address]      Brendia Oppenheim, MD Gastroenterology Follow up in 3 day(s)  Marina Sadler            Discharge Medication List as of 7/29/2018 10:14 AM      START taking these medications    Details   doxycycline hyclate (VIBRAMYCIN) 100 mg capsule Take 1 capsule (100 mg total) by mouth every 12 (twelve) hours for 26 days, Starting Sun 7/29/2018, Until Fri 8/24/2018, Normal      pantoprazole (PROTONIX) 40 mg tablet Take 1 tablet (40 mg total) by mouth daily in the early morning, Starting Sun 7/29/2018, Normal         CONTINUE these medications which have NOT CHANGED    Details   Hormone Cream Base CREA by Does not apply route, Historical Med      thyroid (ARMOUR THYROID) 90 MG tablet Take by mouth, Historical Med             Outpatient Discharge Orders  Discharge Diet     Activity as tolerated     Call provider for:  persistent nausea or vomiting     Call provider for:  severe uncontrolled pain         ED Provider  Electronically Signed by           Margarita Crespo MD  07/30/18 2000       Margarita Crespo MD  07/30/18 8002

## 2018-08-01 LAB
BACTERIA BLD CULT: NORMAL
BACTERIA BLD CULT: NORMAL

## 2018-08-03 ENCOUNTER — TELEPHONE (OUTPATIENT)
Dept: INFECTIOUS DISEASES | Facility: CLINIC | Age: 58
End: 2018-08-03

## 2018-08-03 NOTE — TELEPHONE ENCOUNTER
We called Maninder Cox on 8/2 to advise her that she has mono and does not need to be seen until she is finished with her medication  Maninder Cox called back this morning complaining of joint aches, insomnia, and insisted on being seen immediately because a relative of hers had babesia and she thinks that what she has  I told her that we did not have any appointments with Dr Aissatou Crabtree until 8/20 but promised that I would put her on a cancellation list  She was very appreciative but unsure of what to do since her PCP did not know how to treat her  I told her that if her PCP was that unsure she could call the  and have one of our physicians paged to discuss her lab work  Maninder Cox was very appreciative and was going to do that this morning  We will call patient if Dr Aissatou Crabtree has any cancellations

## 2018-09-06 RX ORDER — CEFUROXIME AXETIL 500 MG/1
500 TABLET ORAL 2 TIMES DAILY
Refills: 0 | COMMUNITY
Start: 2018-08-15

## 2018-09-06 RX ORDER — NYSTATIN 500000 [USP'U]/1
TABLET, COATED ORAL
Refills: 0 | COMMUNITY
Start: 2018-08-16

## 2018-09-10 ENCOUNTER — OFFICE VISIT (OUTPATIENT)
Dept: INFECTIOUS DISEASES | Facility: CLINIC | Age: 58
End: 2018-09-10
Payer: COMMERCIAL

## 2018-09-10 VITALS
SYSTOLIC BLOOD PRESSURE: 128 MMHG | RESPIRATION RATE: 20 BRPM | TEMPERATURE: 97.5 F | BODY MASS INDEX: 28.51 KG/M2 | WEIGHT: 167 LBS | HEIGHT: 64 IN | HEART RATE: 64 BPM | DIASTOLIC BLOOD PRESSURE: 68 MMHG

## 2018-09-10 DIAGNOSIS — R76.8 POSITIVE LYME DISEASE SEROLOGY: ICD-10-CM

## 2018-09-10 DIAGNOSIS — G44.89 OTHER HEADACHE SYNDROME: ICD-10-CM

## 2018-09-10 DIAGNOSIS — R13.10 ODYNOPHAGIA: ICD-10-CM

## 2018-09-10 DIAGNOSIS — R53.82 CHRONIC FATIGUE: Primary | ICD-10-CM

## 2018-09-10 PROCEDURE — 99204 OFFICE O/P NEW MOD 45 MIN: CPT | Performed by: INTERNAL MEDICINE

## 2018-09-10 RX ORDER — DOXYCYCLINE HYCLATE 100 MG/1
100 CAPSULE ORAL EVERY 12 HOURS SCHEDULED
COMMUNITY

## 2018-09-10 RX ORDER — FLUCONAZOLE 100 MG/1
100 TABLET ORAL DAILY
COMMUNITY

## 2018-09-10 NOTE — PROGRESS NOTES
Consultation - Infectious Disease   Radha Quijano 62 y o  female MRN: [id number]  Unit/Bed#:  Encounter: [id number]      IMPRESSION & RECOMMENDATIONS:   1  Chronic fatigue, arthralgias-no clear infectious etiology  Possibly all a post infectious syndrome without any remaining evidence of any infectious etiology  The positive Lyme IgM screen appears to be a false-positive test with the Western blot being negative  If this was originally Lyme disease, she has received more than adequate treatment course for Lyme disease  I explained to the patient in detail that she is unlikely to have a Coinfection as babesiosis causes a febrile hemolytic anemia and her CBC does not reveal evidence of anemia  Also explained that other types of coinfection have been adequately treated with the doxycycline  Consideration for the possibility of an inflammatory condition although I doubt within normal sedimentation rate  -recommend discontinuing doxycycline cefuroxime  -no additional antibiotics for now  -recommend focusing on other health positive strategy such as diet and exercise   -follow-up with the primary care physician for additional workup as needed    2  Positive Lyme serology-only IgM positive on the screen with a Western blot negative  No clinical evidence of any manifestations of Lyme disease  The positive IgM with the negative Western blot is very consistent with a false positive test   -no additional workup or treatment recommended for Lyme disease    3  Headache and odynophagia-perhaps the patient had a viral illness that now seems to have resolved  She did have some lymphadenopathy at the same time based on her description  The signs and symptoms seemed to have resolved  No additional workup needed  HISTORY OF PRESENT ILLNESS:  Reason for Consult:   Lyme disease  HPI: Radha Quijano is a 62y o  year old female who I am asked to assess for possible Lyme disease    Back in July of this year the patient developed severe left-sided neck and throat pain with some difficulty swallowing  She also developed a headache  She was seen by primary care physician who ordered a series of laboratory tests including a Lyme screen that was IgM positive, but Western blot negative  Her symptoms became quite severe and therefore she was admitted to [facility] for [facility] management  She was treated with 2 days of intravenous doxycycline and eventually discharged on oral doxycycline to complete a 1 month course of treatment  The symptoms involving the neck and throat seemed to have improved significantly  However she has continued to suffer from chronic fatigue, knee and other joint pains, and some dysesthesias  Because of these persistent symptoms, she was seen by [facility] health physician who treated her with double dose doxycycline with 200 mg p o  q 12 hours, and Ceftin  She has been on this treatment regimen for 1 month and does not feel any better  She actually feels worse with notable nausea associated with doxycycline  She continues to have intermittent joint swelling and pain involving her knees  She remains quite fatigued  She has lost a few lb, but feels like this is somewhat intentional   She denies having any fever chills or sweats, denies any diarrhea or vomiting, denies any cough or shortness of breath  REVIEW OF SYSTEMS:  A complete 12 point system-based review of systems is otherwise negative      PAST MEDICAL HISTORY:  Past Medical History:   Diagnosis Date    Disease of thyroid gland     Lyme disease, unspecified      Past Surgical History:   Procedure Laterality Date    TONSILLECTOMY         FAMILY HISTORY:  Non-contributory    SOCIAL HISTORY:  Social History   History   Alcohol Use    3 6 oz/week    6 Glasses of wine per week     History   Drug Use No     History   Smoking Status    Never Smoker   Smokeless Tobacco    Never Used       ALLERGIES:  Allergies   Allergen Reactions    Prednisone Swelling    Amoxicillin     Doxycycline GI Intolerance    Tetanus Immune Globulin        MEDICATIONS:  All current active medications have been reviewed  Antibiotics:    PHYSICAL EXAM:  Vitals:    09/10/18 1254   BP: 128/68   Pulse: 64   Resp: 20   Temp: 97 5 °F (36 4 °C)   TempSrc: Tympanic   Weight: 75 8 kg (167 lb)   Height: 5' 4" (1 626 m)         General Appearance:  Appearing well, nontoxic, and in no distress   Head:  Normocephalic, without obvious abnormality, atraumatic   Eyes:  Conjunctiva pink and sclera anicteric, both eyes   Nose: Nares normal, mucosa normal, no drainage   Throat: Oropharynx moist without lesions   Neck: Supple, symmetrical, no adenopathy, no tenderness/mass/nodules   Back:   Symmetric, no curvature, ROM normal, no CVA tenderness   Lungs:   Clear to auscultation bilaterally, respirations unlabored   Chest Wall:  No tenderness or deformity   Heart:  RRR; no murmur, rub or gallop   Abdomen:   Soft, non-tender, non-distended, positive bowel sounds,    Extremities: No cyanosis, clubbing or edema   Skin: No rashes or lesions  No draining wounds noted  Lymph nodes: Cervical, supraclavicular nodes normal   Neurologic: Alert and oriented times 3, extremity strength 5/5 and symmetric       LABS, IMAGING, & OTHER STUDIES:  Lab Results:  I have personally reviewed pertinent labs    Lab Results   Component Value Date     07/29/2018    K 3 6 07/29/2018     (H) 07/29/2018    CO2 24 07/29/2018    BUN 10 07/29/2018    CREATININE 0 61 07/29/2018    GLUF 96 07/24/2018    CALCIUM 9 0 07/29/2018    AST 24 07/24/2018    ALT 34 07/24/2018    ALKPHOS 57 07/24/2018    EGFR 100 07/29/2018     Lab Results   Component Value Date    WBC 4 50 (L) 07/29/2018    HGB 11 8 (L) 07/29/2018    HCT 34 9 07/29/2018    MCV 88 07/29/2018     07/29/2018     EBV VCA IgM negative    CMV IgG positive, IgM negative    Lyme screen IgM positive, IgG negative    Lyme Western blot negative    Sedimentation rate 7    Imaging Studies:   I have personally reviewed pertinent imaging study reports and images in PACS  Other Studies:   I have personally reviewed pertinent reports

## 2023-08-22 NOTE — ASSESSMENT & PLAN NOTE
· Possibly related to Lymes disease  · PPI and carafate, if no improvement consider GI consult may need EGD  · Pain control, lozengers Include Location In Plan?: No Detail Level: Generalized